# Patient Record
Sex: FEMALE | Race: WHITE | Employment: OTHER | URBAN - METROPOLITAN AREA
[De-identification: names, ages, dates, MRNs, and addresses within clinical notes are randomized per-mention and may not be internally consistent; named-entity substitution may affect disease eponyms.]

---

## 2017-02-24 PROBLEM — I44.30 AV BLOCK: Status: ACTIVE | Noted: 2017-02-24

## 2017-05-11 PROBLEM — M79.622 LEFT UPPER ARM PAIN: Status: ACTIVE | Noted: 2017-05-11

## 2017-12-12 PROBLEM — R53.83 FATIGUE: Status: ACTIVE | Noted: 2017-12-12

## 2018-03-13 ENCOUNTER — OFFICE VISIT (OUTPATIENT)
Dept: FAMILY MEDICINE CLINIC | Age: 83
End: 2018-03-13
Payer: MEDICARE

## 2018-03-13 VITALS
OXYGEN SATURATION: 90 % | HEIGHT: 60 IN | RESPIRATION RATE: 20 BRPM | WEIGHT: 120 LBS | BODY MASS INDEX: 23.56 KG/M2 | HEART RATE: 88 BPM | SYSTOLIC BLOOD PRESSURE: 118 MMHG | DIASTOLIC BLOOD PRESSURE: 62 MMHG

## 2018-03-13 DIAGNOSIS — F32.5 MAJOR DEPRESSIVE DISORDER WITH SINGLE EPISODE, IN FULL REMISSION (HCC): ICD-10-CM

## 2018-03-13 DIAGNOSIS — I48.20 CHRONIC ATRIAL FIBRILLATION (HCC): ICD-10-CM

## 2018-03-13 DIAGNOSIS — I10 ESSENTIAL HYPERTENSION, BENIGN: Chronic | ICD-10-CM

## 2018-03-13 DIAGNOSIS — E44.1 MILD PROTEIN-CALORIE MALNUTRITION (HCC): ICD-10-CM

## 2018-03-13 DIAGNOSIS — R53.83 FATIGUE, UNSPECIFIED TYPE: Primary | ICD-10-CM

## 2018-03-13 DIAGNOSIS — I95.1 ORTHOSTATIC HYPOTENSION: ICD-10-CM

## 2018-03-13 PROCEDURE — 1123F ACP DISCUSS/DSCN MKR DOCD: CPT | Performed by: FAMILY MEDICINE

## 2018-03-13 PROCEDURE — G8427 DOCREV CUR MEDS BY ELIG CLIN: HCPCS | Performed by: FAMILY MEDICINE

## 2018-03-13 PROCEDURE — 1036F TOBACCO NON-USER: CPT | Performed by: FAMILY MEDICINE

## 2018-03-13 PROCEDURE — G8420 CALC BMI NORM PARAMETERS: HCPCS | Performed by: FAMILY MEDICINE

## 2018-03-13 PROCEDURE — 4040F PNEUMOC VAC/ADMIN/RCVD: CPT | Performed by: FAMILY MEDICINE

## 2018-03-13 PROCEDURE — 99214 OFFICE O/P EST MOD 30 MIN: CPT | Performed by: FAMILY MEDICINE

## 2018-03-13 PROCEDURE — 1090F PRES/ABSN URINE INCON ASSESS: CPT | Performed by: FAMILY MEDICINE

## 2018-03-13 PROCEDURE — G8484 FLU IMMUNIZE NO ADMIN: HCPCS | Performed by: FAMILY MEDICINE

## 2018-03-13 ASSESSMENT — ENCOUNTER SYMPTOMS
BACK PAIN: 0
COLOR CHANGE: 0
BLOOD IN STOOL: 0
SHORTNESS OF BREATH: 1
SORE THROAT: 0
EYE REDNESS: 0
VOMITING: 0
NAUSEA: 0
CHEST TIGHTNESS: 0
EYE ITCHING: 0
CHANGE IN BOWEL HABIT: 0
CONSTIPATION: 0
APNEA: 0
EYE PAIN: 0
ABDOMINAL PAIN: 0
SINUS PRESSURE: 0
WHEEZING: 0
DIARRHEA: 0
RHINORRHEA: 0
COUGH: 0

## 2018-03-13 NOTE — PROGRESS NOTES
Chief Complaint:     Chief Complaint   Patient presents with   Lakisha May     has been real dizzy and short of breath, unsteady and heaviness in chest    Hypertension    Shortness of Breath         Hypertension   This is a chronic problem. The current episode started more than 1 year ago. The problem has been waxing and waning since onset. The problem is controlled. Associated symptoms include shortness of breath. Pertinent negatives include no anxiety, chest pain, headaches, malaise/fatigue, neck pain, palpitations or peripheral edema. There are no associated agents to hypertension. Risk factors for coronary artery disease include post-menopausal state. Past treatments include beta blockers. The current treatment provides significant improvement. There are no compliance problems. There is no history of CAD/MI, CVA or PVD. Shortness of Breath   This is a new problem. The current episode started today. The problem occurs intermittently. The problem has been waxing and waning. Pertinent negatives include no abdominal pain, chest pain, ear pain, fever, headaches, leg swelling, neck pain, rash, rhinorrhea, sore throat, vomiting or wheezing. Nothing aggravates the symptoms. The patient has no known risk factors for DVT/PE. She has tried nothing for the symptoms. The treatment provided no relief. Fatigue   This is a chronic problem. The current episode started more than 1 year ago. The problem occurs intermittently. The problem has been waxing and waning. Associated symptoms include fatigue. Pertinent negatives include no abdominal pain, anorexia, arthralgias, change in bowel habit, chest pain, congestion, coughing, fever, headaches, myalgias, nausea, neck pain, numbness, rash, sore throat, vomiting or weakness. Nothing aggravates the symptoms. She has tried nothing for the symptoms. The treatment provided no relief.        Patient Active Problem List   Diagnosis    Essential hypertension, benign    Dementia    Atrial fibrillation ([facility]    Depression    GERD (gastroesophageal reflux disease)    Valvular heart disease    Cardiac pacemaker in situ    CKD (chronic kidney disease) stage 3, GFR 30-59 ml/min    Dizziness    Orthostatic hypotension    Malnutrition (HCC)    Gait difficulty    Seborrheic keratoses    AV block    Left upper arm pain    Fatigue       Past Medical History:   Diagnosis Date    Acid reflux disease     Atrial fibrillation (HCC)     h/o    AVB (atrioventricular block)     COPD (chronic obstructive pulmonary disease) (HCC)     Dementia     Depression     Hypotension     Intracerebral hematoma ([facility] [address] 2009    d/t fall    Nausea & vomiting     Seizure disorder ([facility] [address] 2009    related to intracranial bleed    Syncope and collapse 5/25/2014       Past Surgical History:   Procedure Laterality Date    BRAIN SURGERY  2009    icb    BREAST SURGERY      cysts    CARDIAC CATHETERIZATION  2001 or 2012    CATARACT REMOVAL WITH IMPLANT Bilateral 10 yrs ago    CHOLECYSTECTOMY      ERCP  09/30/2014    With stent placement    ERCP  11/14/14    PACEMAKER INSERTION Left 6-    Dr. Mcarthur Duane       Current Outpatient Prescriptions   Medication Sig Dispense Refill    LORazepam (ATIVAN) 0.5 MG tablet Take 1 Half Tablet (0.25MG) by mouth once daily in the AM and take 3 Half Tablets (0.75MG) at bedtime 60 tablet 2    Dextromethorphan-guaiFENesin  MG/5ML SYRP Take 5 mLs by mouth every 4 hours as needed for Cough      ibuprofen (ADVIL;MOTRIN) 200 MG tablet Take 200 mg by mouth every 6 hours as needed for Pain      magnesium hydroxide (MILK OF MAGNESIA) 400 MG/5ML suspension Take by mouth daily as needed for Constipation      PARoxetine (PAXIL) 10 MG tablet Take 10 mg by mouth every morning      loratadine (CLARITIN) 10 MG tablet Take 10 mg by mouth daily      gabapentin (NEURONTIN) 100 MG capsule Take 100 mg by mouth 3 times daily      potassium chloride SA motion. She exhibits no edema or tenderness. Lymphadenopathy:     She has no cervical adenopathy. Neurological: She is alert and oriented to person, place, and time. She has normal reflexes. No cranial nerve deficit. Skin: Skin is warm and dry. No rash noted. No erythema. Psychiatric: She has a normal mood and affect. Nursing note and vitals reviewed. ASSESSMENT/PLAN:    Patient Active Problem List   Diagnosis    Essential hypertension, benign    Dementia    Atrial fibrillation (HCC)    Depression    GERD (gastroesophageal reflux disease)    Valvular heart disease    Cardiac pacemaker in situ    CKD (chronic kidney disease) stage 3, GFR 30-59 ml/min    Dizziness    Orthostatic hypotension    Malnutrition (HCC)    Gait difficulty    Seborrheic keratoses    AV block    Left upper arm pain    Fatigue       Dakota Turner was seen today for check-up, hypertension and shortness of breath. Diagnoses and all orders for this visit:    Fatigue, unspecified type    Major depressive disorder with single episode, in full remission ([address]    Chronic atrial fibrillation ([address]    Mild protein-calorie malnutrition ([address]    Orthostatic hypotension    Essential hypertension, benign          Return in about 3 months (around 6/13/2018) for Follow up HTN, Recheck Meds.         Som Roberts DO  3/13/2018  11:14 AM

## 2018-04-09 ENCOUNTER — TELEPHONE (OUTPATIENT)
Dept: FAMILY MEDICINE CLINIC | Age: 83
End: 2018-04-09

## 2018-04-27 ENCOUNTER — OFFICE VISIT (OUTPATIENT)
Dept: NON INVASIVE DIAGNOSTICS | Age: 83
End: 2018-04-27
Payer: MEDICARE

## 2018-04-27 VITALS
BODY MASS INDEX: 23.56 KG/M2 | HEART RATE: 88 BPM | SYSTOLIC BLOOD PRESSURE: 130 MMHG | RESPIRATION RATE: 16 BRPM | DIASTOLIC BLOOD PRESSURE: 70 MMHG | HEIGHT: 60 IN | WEIGHT: 120 LBS

## 2018-04-27 DIAGNOSIS — Z95.0 CARDIAC PACEMAKER IN SITU: Primary | ICD-10-CM

## 2018-04-27 DIAGNOSIS — I48.0 PAROXYSMAL ATRIAL FIBRILLATION (HCC): ICD-10-CM

## 2018-04-27 PROCEDURE — G8420 CALC BMI NORM PARAMETERS: HCPCS | Performed by: INTERNAL MEDICINE

## 2018-04-27 PROCEDURE — 93280 PM DEVICE PROGR EVAL DUAL: CPT | Performed by: INTERNAL MEDICINE

## 2018-04-27 PROCEDURE — G8427 DOCREV CUR MEDS BY ELIG CLIN: HCPCS | Performed by: INTERNAL MEDICINE

## 2018-04-27 PROCEDURE — 1036F TOBACCO NON-USER: CPT | Performed by: INTERNAL MEDICINE

## 2018-04-27 PROCEDURE — 99214 OFFICE O/P EST MOD 30 MIN: CPT | Performed by: INTERNAL MEDICINE

## 2018-04-27 PROCEDURE — 1123F ACP DISCUSS/DSCN MKR DOCD: CPT | Performed by: INTERNAL MEDICINE

## 2018-04-27 PROCEDURE — 1090F PRES/ABSN URINE INCON ASSESS: CPT | Performed by: INTERNAL MEDICINE

## 2018-04-27 PROCEDURE — 4040F PNEUMOC VAC/ADMIN/RCVD: CPT | Performed by: INTERNAL MEDICINE

## 2018-04-27 RX ORDER — FLUDROCORTISONE ACETATE 0.1 MG/1
TABLET ORAL
Qty: 90 TABLET | Refills: 5
Start: 2018-04-27 | End: 2020-09-03

## 2018-06-15 ENCOUNTER — OFFICE VISIT (OUTPATIENT)
Dept: FAMILY MEDICINE CLINIC | Age: 83
End: 2018-06-15
Payer: MEDICARE

## 2018-06-15 VITALS
SYSTOLIC BLOOD PRESSURE: 138 MMHG | DIASTOLIC BLOOD PRESSURE: 84 MMHG | OXYGEN SATURATION: 93 % | BODY MASS INDEX: 23.95 KG/M2 | WEIGHT: 122 LBS | HEIGHT: 60 IN | HEART RATE: 66 BPM | RESPIRATION RATE: 18 BRPM

## 2018-06-15 DIAGNOSIS — R53.83 FATIGUE, UNSPECIFIED TYPE: ICD-10-CM

## 2018-06-15 DIAGNOSIS — F03.90 DEMENTIA WITHOUT BEHAVIORAL DISTURBANCE, UNSPECIFIED DEMENTIA TYPE: Chronic | ICD-10-CM

## 2018-06-15 DIAGNOSIS — G25.0 BENIGN ESSENTIAL TREMOR: ICD-10-CM

## 2018-06-15 DIAGNOSIS — I95.1 ORTHOSTATIC HYPOTENSION: ICD-10-CM

## 2018-06-15 DIAGNOSIS — I48.20 CHRONIC ATRIAL FIBRILLATION (HCC): Primary | ICD-10-CM

## 2018-06-15 DIAGNOSIS — I10 ESSENTIAL HYPERTENSION, BENIGN: Chronic | ICD-10-CM

## 2018-06-15 PROCEDURE — 1036F TOBACCO NON-USER: CPT | Performed by: FAMILY MEDICINE

## 2018-06-15 PROCEDURE — 1123F ACP DISCUSS/DSCN MKR DOCD: CPT | Performed by: FAMILY MEDICINE

## 2018-06-15 PROCEDURE — G8420 CALC BMI NORM PARAMETERS: HCPCS | Performed by: FAMILY MEDICINE

## 2018-06-15 PROCEDURE — 99214 OFFICE O/P EST MOD 30 MIN: CPT | Performed by: FAMILY MEDICINE

## 2018-06-15 PROCEDURE — 4040F PNEUMOC VAC/ADMIN/RCVD: CPT | Performed by: FAMILY MEDICINE

## 2018-06-15 PROCEDURE — 1090F PRES/ABSN URINE INCON ASSESS: CPT | Performed by: FAMILY MEDICINE

## 2018-06-15 PROCEDURE — G8427 DOCREV CUR MEDS BY ELIG CLIN: HCPCS | Performed by: FAMILY MEDICINE

## 2018-06-15 ASSESSMENT — ENCOUNTER SYMPTOMS
EYE ITCHING: 0
COLOR CHANGE: 0
WHEEZING: 0
VOMITING: 0
DIARRHEA: 0
RHINORRHEA: 0
APNEA: 0
ABDOMINAL PAIN: 0
BACK PAIN: 0
COUGH: 0
SORE THROAT: 0
SHORTNESS OF BREATH: 0
EYE REDNESS: 0
BLOOD IN STOOL: 0
CHEST TIGHTNESS: 0
CONSTIPATION: 0
CHANGE IN BOWEL HABIT: 0
NAUSEA: 0
SINUS PRESSURE: 0
EYE PAIN: 0

## 2018-07-10 ENCOUNTER — TELEPHONE (OUTPATIENT)
Dept: FAMILY MEDICINE CLINIC | Age: 83
End: 2018-07-10

## 2018-07-10 NOTE — TELEPHONE ENCOUNTER
Demarco Christianson from [employer] calling for patient, states that when daughter was in for a visit, she began to get very anxious, started shaking and then has trouble concentrating/focusing during her episodes. She is doing okay now and all vitals are stable. She is taking Paxil 10 mg and Ativan 0.5 mg in AM and 0.75 mg qhs. Daughter and Demarco Christianson would like to know if you would like to add or change her treatment plan or continue and monitor for now?  Please advise can reach Demarco Christianson at [phone]

## 2018-07-13 DIAGNOSIS — F41.9 ANXIETY: Primary | ICD-10-CM

## 2018-07-13 RX ORDER — LORAZEPAM 0.5 MG/1
TABLET ORAL
Qty: 60 TABLET | Refills: 2 | Status: ON HOLD | OUTPATIENT
Start: 2018-07-13 | End: 2018-08-10 | Stop reason: HOSPADM

## 2018-07-30 ENCOUNTER — TELEPHONE (OUTPATIENT)
Dept: NON INVASIVE DIAGNOSTICS | Age: 83
End: 2018-07-30

## 2018-08-09 ENCOUNTER — HOSPITAL ENCOUNTER (OUTPATIENT)
Age: 83
Setting detail: OBSERVATION
Discharge: HOME | End: 2018-08-10
Attending: EMERGENCY MEDICINE | Admitting: INTERNAL MEDICINE
Payer: MEDICARE

## 2018-08-09 ENCOUNTER — APPOINTMENT (OUTPATIENT)
Dept: GENERAL RADIOLOGY | Age: 83
End: 2018-08-09
Payer: MEDICARE

## 2018-08-09 DIAGNOSIS — N17.9 ACUTE KIDNEY INJURY (HCC): ICD-10-CM

## 2018-08-09 DIAGNOSIS — I48.91 ATRIAL FIBRILLATION WITH RVR (HCC): Primary | ICD-10-CM

## 2018-08-09 LAB
ALBUMIN SERPL-MCNC: 3.6 G/DL (ref 3.5–5.2)
ALP BLD-CCNC: 110 U/L (ref 35–104)
ALT SERPL-CCNC: 11 U/L (ref 0–32)
ANION GAP SERPL CALCULATED.3IONS-SCNC: 13 MMOL/L (ref 7–16)
APTT: 26.9 SEC (ref 24.5–35.1)
AST SERPL-CCNC: 17 U/L (ref 0–31)
BACTERIA: ABNORMAL /HPF
BASOPHILS ABSOLUTE: 0.04 E9/L (ref 0–0.2)
BASOPHILS RELATIVE PERCENT: 0.3 % (ref 0–2)
BILIRUB SERPL-MCNC: 0.7 MG/DL (ref 0–1.2)
BILIRUBIN URINE: NEGATIVE
BLOOD, URINE: NEGATIVE
BUN BLDV-MCNC: 19 MG/DL (ref 8–23)
CALCIUM SERPL-MCNC: 9.5 MG/DL (ref 8.6–10.2)
CHLORIDE BLD-SCNC: 102 MMOL/L (ref 98–107)
CLARITY: CLEAR
CO2: 26 MMOL/L (ref 22–29)
COLOR: YELLOW
CREAT SERPL-MCNC: 1.3 MG/DL (ref 0.5–1)
EOSINOPHILS ABSOLUTE: 0.03 E9/L (ref 0.05–0.5)
EOSINOPHILS RELATIVE PERCENT: 0.3 % (ref 0–6)
EPITHELIAL CELLS, UA: ABNORMAL /HPF
GFR AFRICAN AMERICAN: 46
GFR NON-AFRICAN AMERICAN: 38 ML/MIN/1.73
GLUCOSE BLD-MCNC: 110 MG/DL (ref 74–109)
GLUCOSE URINE: NEGATIVE MG/DL
HCT VFR BLD CALC: 38.8 % (ref 34–48)
HEMOGLOBIN: 12.5 G/DL (ref 11.5–15.5)
IMMATURE GRANULOCYTES #: 0.05 E9/L
IMMATURE GRANULOCYTES %: 0.4 % (ref 0–5)
INR BLD: 1.2
KETONES, URINE: 15 MG/DL
LACTIC ACID: 1.8 MMOL/L (ref 0.5–2.2)
LEUKOCYTE ESTERASE, URINE: ABNORMAL
LYMPHOCYTES ABSOLUTE: 2.23 E9/L (ref 1.5–4)
LYMPHOCYTES RELATIVE PERCENT: 18.8 % (ref 20–42)
MAGNESIUM: 1.9 MG/DL (ref 1.6–2.6)
MCH RBC QN AUTO: 32.2 PG (ref 26–35)
MCHC RBC AUTO-ENTMCNC: 32.2 % (ref 32–34.5)
MCV RBC AUTO: 100 FL (ref 80–99.9)
MONOCYTES ABSOLUTE: 1.18 E9/L (ref 0.1–0.95)
MONOCYTES RELATIVE PERCENT: 10 % (ref 2–12)
NEUTROPHILS ABSOLUTE: 8.31 E9/L (ref 1.8–7.3)
NEUTROPHILS RELATIVE PERCENT: 70.2 % (ref 43–80)
NITRITE, URINE: POSITIVE
PDW BLD-RTO: 12.7 FL (ref 11.5–15)
PH UA: 6 (ref 5–9)
PLATELET # BLD: 142 E9/L (ref 130–450)
PMV BLD AUTO: 10.6 FL (ref 7–12)
POTASSIUM SERPL-SCNC: 4.2 MMOL/L (ref 3.5–5)
PRO-BNP: 981 PG/ML (ref 0–450)
PROTEIN UA: ABNORMAL MG/DL
PROTHROMBIN TIME: 13.6 SEC (ref 9.3–12.4)
RBC # BLD: 3.88 E12/L (ref 3.5–5.5)
RBC UA: ABNORMAL /HPF (ref 0–2)
SODIUM BLD-SCNC: 141 MMOL/L (ref 132–146)
SPECIFIC GRAVITY UA: 1.01 (ref 1–1.03)
TOTAL PROTEIN: 7.3 G/DL (ref 6.4–8.3)
TROPONIN: <0.01 NG/ML (ref 0–0.03)
UROBILINOGEN, URINE: 0.2 E.U./DL
WBC # BLD: 11.8 E9/L (ref 4.5–11.5)
WBC UA: >20 /HPF (ref 0–5)

## 2018-08-09 PROCEDURE — 85730 THROMBOPLASTIN TIME PARTIAL: CPT

## 2018-08-09 PROCEDURE — 99220 PR INITIAL OBSERVATION CARE/DAY 70 MINUTES: CPT | Performed by: INTERNAL MEDICINE

## 2018-08-09 PROCEDURE — G0378 HOSPITAL OBSERVATION PER HR: HCPCS

## 2018-08-09 PROCEDURE — 83605 ASSAY OF LACTIC ACID: CPT

## 2018-08-09 PROCEDURE — 2700000000 HC OXYGEN THERAPY PER DAY

## 2018-08-09 PROCEDURE — 81001 URINALYSIS AUTO W/SCOPE: CPT

## 2018-08-09 PROCEDURE — 99285 EMERGENCY DEPT VISIT HI MDM: CPT

## 2018-08-09 PROCEDURE — 83735 ASSAY OF MAGNESIUM: CPT

## 2018-08-09 PROCEDURE — 71045 X-RAY EXAM CHEST 1 VIEW: CPT

## 2018-08-09 PROCEDURE — 80053 COMPREHEN METABOLIC PANEL: CPT

## 2018-08-09 PROCEDURE — 2580000003 HC RX 258: Performed by: EMERGENCY MEDICINE

## 2018-08-09 PROCEDURE — 85025 COMPLETE CBC W/AUTO DIFF WBC: CPT

## 2018-08-09 PROCEDURE — 83880 ASSAY OF NATRIURETIC PEPTIDE: CPT

## 2018-08-09 PROCEDURE — 2500000003 HC RX 250 WO HCPCS: Performed by: EMERGENCY MEDICINE

## 2018-08-09 PROCEDURE — 6370000000 HC RX 637 (ALT 250 FOR IP): Performed by: INTERNAL MEDICINE

## 2018-08-09 PROCEDURE — 2580000003 HC RX 258: Performed by: NURSE PRACTITIONER

## 2018-08-09 PROCEDURE — 96372 THER/PROPH/DIAG INJ SC/IM: CPT

## 2018-08-09 PROCEDURE — 84484 ASSAY OF TROPONIN QUANT: CPT

## 2018-08-09 PROCEDURE — 6370000000 HC RX 637 (ALT 250 FOR IP): Performed by: EMERGENCY MEDICINE

## 2018-08-09 PROCEDURE — APPSS45 APP SPLIT SHARED TIME 31-45 MINUTES: Performed by: NURSE PRACTITIONER

## 2018-08-09 PROCEDURE — 96374 THER/PROPH/DIAG INJ IV PUSH: CPT

## 2018-08-09 PROCEDURE — 6360000002 HC RX W HCPCS: Performed by: NURSE PRACTITIONER

## 2018-08-09 PROCEDURE — 85610 PROTHROMBIN TIME: CPT

## 2018-08-09 PROCEDURE — 87040 BLOOD CULTURE FOR BACTERIA: CPT

## 2018-08-09 PROCEDURE — 6370000000 HC RX 637 (ALT 250 FOR IP): Performed by: NURSE PRACTITIONER

## 2018-08-09 RX ORDER — DILTIAZEM HYDROCHLORIDE 120 MG/1
120 CAPSULE, COATED, EXTENDED RELEASE ORAL ONCE
Status: COMPLETED | OUTPATIENT
Start: 2018-08-09 | End: 2018-08-09

## 2018-08-09 RX ORDER — FLUDROCORTISONE ACETATE 0.1 MG/1
0.1 TABLET ORAL
Status: DISCONTINUED | OUTPATIENT
Start: 2018-08-09 | End: 2018-08-10 | Stop reason: HOSPADM

## 2018-08-09 RX ORDER — 0.9 % SODIUM CHLORIDE 0.9 %
30 INTRAVENOUS SOLUTION INTRAVENOUS ONCE
Status: COMPLETED | OUTPATIENT
Start: 2018-08-09 | End: 2018-08-09

## 2018-08-09 RX ORDER — FLUDROCORTISONE ACETATE 0.1 MG/1
0.2 TABLET ORAL EVERY MORNING
Status: DISCONTINUED | OUTPATIENT
Start: 2018-08-10 | End: 2018-08-10 | Stop reason: HOSPADM

## 2018-08-09 RX ORDER — ASPIRIN 81 MG/1
81 TABLET, CHEWABLE ORAL EVERY MORNING
Status: DISCONTINUED | OUTPATIENT
Start: 2018-08-10 | End: 2018-08-10 | Stop reason: HOSPADM

## 2018-08-09 RX ORDER — ADHESIVE BANDAGE 3/4"
1 BANDAGE TOPICAL PRN
COMMUNITY

## 2018-08-09 RX ORDER — ACETAMINOPHEN 325 MG/1
650 TABLET ORAL NIGHTLY PRN
Status: DISCONTINUED | OUTPATIENT
Start: 2018-08-09 | End: 2018-08-10 | Stop reason: HOSPADM

## 2018-08-09 RX ORDER — MEMANTINE HYDROCHLORIDE 5 MG/1
5 TABLET ORAL DAILY
Status: DISCONTINUED | OUTPATIENT
Start: 2018-08-09 | End: 2018-08-10 | Stop reason: HOSPADM

## 2018-08-09 RX ORDER — SODIUM CHLORIDE 0.9 % (FLUSH) 0.9 %
10 SYRINGE (ML) INJECTION EVERY 12 HOURS SCHEDULED
Status: DISCONTINUED | OUTPATIENT
Start: 2018-08-09 | End: 2018-08-10 | Stop reason: HOSPADM

## 2018-08-09 RX ORDER — ETOMIDATE 2 MG/ML
8 INJECTION INTRAVENOUS ONCE
Status: COMPLETED | OUTPATIENT
Start: 2018-08-09 | End: 2018-08-09

## 2018-08-09 RX ORDER — CETIRIZINE HYDROCHLORIDE 10 MG/1
5 TABLET ORAL DAILY
Status: DISCONTINUED | OUTPATIENT
Start: 2018-08-09 | End: 2018-08-10 | Stop reason: HOSPADM

## 2018-08-09 RX ORDER — PAROXETINE 10 MG/1
10 TABLET, FILM COATED ORAL EVERY MORNING
Status: DISCONTINUED | OUTPATIENT
Start: 2018-08-10 | End: 2018-08-10 | Stop reason: HOSPADM

## 2018-08-09 RX ORDER — ETOMIDATE 2 MG/ML
INJECTION INTRAVENOUS DAILY PRN
Status: COMPLETED | OUTPATIENT
Start: 2018-08-09 | End: 2018-08-09

## 2018-08-09 RX ORDER — GABAPENTIN 100 MG/1
100 CAPSULE ORAL EVERY MORNING
Status: DISCONTINUED | OUTPATIENT
Start: 2018-08-10 | End: 2018-08-10 | Stop reason: HOSPADM

## 2018-08-09 RX ORDER — SODIUM CHLORIDE 0.9 % (FLUSH) 0.9 %
10 SYRINGE (ML) INJECTION PRN
Status: DISCONTINUED | OUTPATIENT
Start: 2018-08-09 | End: 2018-08-10 | Stop reason: HOSPADM

## 2018-08-09 RX ORDER — GUAIFENESIN 100 MG/5ML
200 SYRUP ORAL EVERY 6 HOURS PRN
COMMUNITY
End: 2018-12-21

## 2018-08-09 RX ORDER — LORAZEPAM 0.5 MG/1
0.75 TABLET ORAL NIGHTLY
Status: ON HOLD | COMMUNITY
End: 2018-08-10 | Stop reason: HOSPADM

## 2018-08-09 RX ORDER — FLUDROCORTISONE ACETATE 0.1 MG/1
0.2 TABLET ORAL EVERY MORNING
Status: ON HOLD | COMMUNITY
End: 2018-08-10 | Stop reason: HOSPADM

## 2018-08-09 RX ORDER — POLYVINYL ALCOHOL 14 MG/ML
1 SOLUTION/ DROPS OPHTHALMIC 4 TIMES DAILY
Status: DISCONTINUED | OUTPATIENT
Start: 2018-08-09 | End: 2018-08-10 | Stop reason: HOSPADM

## 2018-08-09 RX ORDER — ONDANSETRON 2 MG/ML
4 INJECTION INTRAMUSCULAR; INTRAVENOUS EVERY 6 HOURS PRN
Status: DISCONTINUED | OUTPATIENT
Start: 2018-08-09 | End: 2018-08-10 | Stop reason: HOSPADM

## 2018-08-09 RX ORDER — PANTOPRAZOLE SODIUM 40 MG/1
40 TABLET, DELAYED RELEASE ORAL EVERY MORNING
Status: DISCONTINUED | OUTPATIENT
Start: 2018-08-10 | End: 2018-08-10 | Stop reason: HOSPADM

## 2018-08-09 RX ORDER — CETIRIZINE HYDROCHLORIDE 10 MG/1
10 TABLET ORAL DAILY
Status: DISCONTINUED | OUTPATIENT
Start: 2018-08-09 | End: 2018-08-09 | Stop reason: DRUGHIGH

## 2018-08-09 RX ADMIN — SODIUM CHLORIDE 1632 ML: 9 INJECTION, SOLUTION INTRAVENOUS at 11:48

## 2018-08-09 RX ADMIN — Medication 10 ML: at 20:29

## 2018-08-09 RX ADMIN — POLYVINYL ALCOHOL 1 DROP: 14 SOLUTION/ DROPS OPHTHALMIC at 20:28

## 2018-08-09 RX ADMIN — DILTIAZEM HYDROCHLORIDE 120 MG: 120 CAPSULE, COATED, EXTENDED RELEASE ORAL at 14:01

## 2018-08-09 RX ADMIN — MEMANTINE HYDROCHLORIDE 5 MG: 5 TABLET ORAL at 20:28

## 2018-08-09 RX ADMIN — FLUDROCORTISONE ACETATE 0.1 MG: 0.1 TABLET ORAL at 20:28

## 2018-08-09 RX ADMIN — CETIRIZINE HYDROCHLORIDE 5 MG: 10 TABLET, FILM COATED ORAL at 20:28

## 2018-08-09 RX ADMIN — ETOMIDATE 8 MG: 2 INJECTION INTRAVENOUS at 11:57

## 2018-08-09 RX ADMIN — ACETAMINOPHEN 650 MG: 325 TABLET ORAL at 21:44

## 2018-08-09 RX ADMIN — ENOXAPARIN SODIUM 30 MG: 30 INJECTION SUBCUTANEOUS at 20:28

## 2018-08-09 ASSESSMENT — PAIN SCALES - GENERAL
PAINLEVEL_OUTOF10: 5
PAINLEVEL_OUTOF10: 0
PAINLEVEL_OUTOF10: 0

## 2018-08-09 ASSESSMENT — PAIN DESCRIPTION - FREQUENCY: FREQUENCY: CONTINUOUS

## 2018-08-09 ASSESSMENT — PAIN DESCRIPTION - ORIENTATION: ORIENTATION: MID

## 2018-08-09 ASSESSMENT — PAIN DESCRIPTION - LOCATION: LOCATION: CHEST

## 2018-08-09 ASSESSMENT — PAIN DESCRIPTION - DESCRIPTORS: DESCRIPTORS: PRESSURE

## 2018-08-09 ASSESSMENT — PAIN DESCRIPTION - PAIN TYPE: TYPE: ACUTE PAIN

## 2018-08-09 NOTE — ED PROVIDER NOTES
with severe systemic disease that limits activity but is not incapacitating  Sedation/ Anesthesia Plan: intravenous sedation  Medications Used: etomidate intravenously  Monitoring and Safety: The patient was placed on a cardiac monitor and vital signs, pulse oximetry and level of consciousness were continuously evaluated throughout the procedure. The patient was closely monitored until recovery from the medications was complete and the patient had returned to baseline status. Respiratory therapy was on standby at all times during the procedure.    ----------------------------------- Post Conscious Sedation Note -----------------------------------  (The following Post Sedation note must be completed)  8/9/18     Time: 1145 (as per nursing note stop time)  Post-Sedation Vital Signs: Vital signs were reviewed and were stable after the procedure (see flow sheet for vitals) Post-Sedation Exam: Lungs: clear to auscultation bilaterally and Cardiovascular: regular rate and rhythm, no murmurs rubs or gallops       Complications: none    Electronically Signed by: Andrew Street MD       PROCEDURE  8/9/18       Time: 1136    CARDIOVERSION  Risks, benefits and alternatives (for applicable procedures below) described. Performed By: Andrew Street MD.    Indication: atrial fibrillation. Informed consent obtained: The patient provided verbal consent for this procedure. .  Procedure:  Vagal maneuvers were not attempted. Prior to the Procedure, anesthsia given: yes; conscious sedation sheet completed. .  Cardioversion was performed under my order and direction, and was attempted by synchronous mode, 1 times with 50 Joules. The resultant rhythm was:  conversion to normal sinus rhythm. Complications: None. Patient tolerated the procedure well. Cardiology Consult Placed:  yes. Medical Decision Making:    Pt is a 79 y/o Female presenting to ED for shortness of breath, chest pain, tachycardia.  ED medications ordered. Labs obtained and reviewed. cardiology consult they agree with cardiovert. Pt and pt daughter both understand the risks and benefits of the cardiovert and give consent. Counseling: The emergency provider has spoken with the patient and discussed todays results, in addition to providing specific details for the plan of care and counseling regarding the diagnosis and prognosis. Questions are answered at this time and they are agreeable with the plan. Time: 11:39AM.   Spoke with Dr. Jenna Cedeno (Cardiology). Discussed case. They agree pt needs cardioverted . Time: 1:50PM.   Spoke with Dr. Federica Anthony (Medicine). Discussed case. They will admit this patient.    --------------------------------- IMPRESSION AND DISPOSITION ---------------------------------    IMPRESSION  1. Atrial fibrillation with RVR ([facility] [address]    2. Acute kidney injury ([facility] [address]        DISPOSITION  Disposition: Admit to telemetry  Patient condition is poor    8/9/18, 11:30 AM.    This note is prepared by Mode Camargo, acting as Scribe for Angel Alcocer MD.    Angel Alcocer MD:  The scribe's documentation has been prepared under my direction and personally reviewed by me in its entirety. I confirm that the note above accurately reflects all work, treatment, procedures, and medical decision making performed by me.            Angel Alcocer MD  08/09/18 1117

## 2018-08-09 NOTE — ED NOTES
Brother, daughter, and other family member at bedside, patient alert and answering appropriately     Avis Truong RN  08/09/18 1957

## 2018-08-09 NOTE — H&P
[facility] History and Physical      CHIEF COMPLAINT:  Chest pain and shortness of breath    History of Present Ilness: This is a 80 y.o. Female presenting to [facility] from a SNF with complaints of chest pain and shortness and breath. She has a past medical history significant for PAF, COPD, Dementia, intracerebral hematoma with seizure post surgery and pacemaker. She reports shortness of breath and chest discomfort for approximately one week that worsened over the last week. She states this morning when she tried to get up into her rocking chair the pain became so intense and she could not cath her breath. Her daughter who came to visit could feel her that he heart rate was racing asked that she come tot  ER for evaluation. She reports seeing her PCP recently for tremors and weakness. Her daughter states nothing was found on her workup. She saw Dr. Vipul Lane and her pacer was checked over the phone and she was told it was working fine. She reports she is unsure when she was originally diagnosed with afib, per EMR her last visit to Dr. Vipul Lane was in April 2018 and she was in sinus rhythm at the time. She was diagnosed with PAF in 2014. She has not been a candidate for anticoagulation due to falls. In ER she was found to be in Afib with RVR. She was noted to be very hypotensive in the 70's. She is known to Dr. Vipul Lane so a consult was placed to EP and Dr. Asif Kapoor recommended cardioversion. She was cardioverted while in ER and is now in sinus rhythm. She was given one dose of oral Cardizem. Her vital signs have improved and she is now pain free and breathing has improved. She remains on 2L NC. She will be admitted for further evaluation and treatment. All past medical, surgical, family and social history was reviewed and verified with the patient and family present.      Informant(s) for H&P:Patient, daughter, son, EMR    REVIEW OF SYSTEMS:  A comprehensive review of systems was negative except for: what is in the [facility]      PMH:  Past Medical History:   Diagnosis Date    Acid reflux disease     Atrial fibrillation ([facility] [address]     h/o    AVB (atrioventricular block)     COPD (chronic obstructive pulmonary disease) (HCC)     Dementia     Depression     Hypotension     Intracerebral hematoma ([facility] [address] 2009    d/t fall    Nausea & vomiting     Seizure disorder ([facility] [address] 2009    related to intracranial bleed    Syncope and collapse 5/25/2014       Surgical History:  Past Surgical History:   Procedure Laterality Date    BRAIN SURGERY  2009    icb    BREAST SURGERY      cysts    CARDIAC CATHETERIZATION  2001 or 2012    CATARACT REMOVAL WITH IMPLANT Bilateral 10 yrs ago    CHOLECYSTECTOMY      ERCP  09/30/2014    With stent placement    ERCP  11/14/14    PACEMAKER INSERTION Left 6-    Dr. Lisa Anand       Medications Prior to Admission:    Prior to Admission medications    Medication Sig Start Date End Date Taking? Authorizing Provider   LORazepam (ATIVAN) 0.5 MG tablet Take 0.75 mg by mouth nightly. .   Yes Historical Provider, MD   fludrocortisone (FLORINEF) 0.1 MG tablet Take 0.2 mg by mouth every morning   Yes Historical Provider, MD   Sodium Phosphates (ENEMA DISPOSABLE) ENEM Place 1 enema rectally as needed   Yes Historical Provider, MD   guaiFENesin (ROBITUSSIN) 100 MG/5ML syrup Take 200 mg by mouth every 6 hours as needed for Cough   Yes Historical Provider, MD   LORazepam (ATIVAN) 0.5 MG tablet Take 1 tablet by mouth daily as needed. Patient taking differently: Take 0.5 mg by mouth every morning.  . 7/13/18 10/13/18 Yes Juancarlos Neil,    hypromellose (ARTIFICIAL TEARS) 0.4 % SOLN ophthalmic solution Place 1 drop into both eyes 4 times daily    Yes Historical Provider, MD   fludrocortisone (FLORINEF) 0.1 MG tablet Take 2 tablets daily in the morning and 1 tablet daily in the afternoon  Patient taking differently: Take 0.1 mg by mouth Daily with lunch  4/27/18  Yes Ju Oneal branch block. Interpretation: no acute changes  Comparison: no previous EKG available     Repeat EKG.    EKG: This EKG is signed and interpreted by the EP. Time: 12:12  Rate: 95  Rhythm: normal sinus rhythm  Interpretation: no acute changes  Comparison: no previous EKG available    ASSESSMENT:      Principal Problem:    Atrial fibrillation (HCC)  Active Problems:    Dementia    Depression    CKD (chronic kidney disease) stage 3, GFR 30-59 ml/min    Orthostatic hypotension    AV block  Resolved Problems:    * No resolved hospital problems. *      PLAN:    1. Atrial fibrillation: RVR on admission. H/O PAF diagnosed in 2014. Cardioverted in ER. Currently in sinus rhythm. Received PO Cardizem x 1 dose. Admit to telemetry. Consult placed to EP. Continue ASA. Was previously not a candidate for anticoagulation due to falls. CHA2DS2-VASc=3 Defer to EP. 2. H/O AV block: Pacer placed in 2014  3. H/O Orthostatic hypotension: Continue Florinef. Monitor BP   4. Dementia: Continue Namenda  5. Depression: stable continue meds  6. SHERIE on CKD: likely related to #1. Will repeat labs in AM. Unsure of patient's baseline creatinine. 7. PT/OT eval and treat    Code Status: DNRCCA  DVT prophylaxis: Lovenox        Electronically signed by CASSIE Phelps CNP on 8/9/2018 at 3:53 PM   HOSPITALIST ATTENDING PHYSICIAN NOTE 8/9/2018 1851PM:    Details of the evaluation - subjective assessment (including medication profile, past medical, family and social history when applicable), examination, review of lab and test data, diagnostic impressions and medical decision making - performed by CASSIE Phelps CNP, were discussed with me on the date of service and I agree with clinical information herein unless otherwise noted. The patient has been evaluated by me personally at this time.   Pt reports no fevers, chills,n/v         PHYSICAL EXAM:    Vitals:  BP (!) 146/82   Pulse 82   Temp 98.4 °F (36.9 °C) (Oral)   Resp 16

## 2018-08-10 VITALS
HEIGHT: 60 IN | WEIGHT: 115 LBS | DIASTOLIC BLOOD PRESSURE: 58 MMHG | HEART RATE: 88 BPM | TEMPERATURE: 98.6 F | BODY MASS INDEX: 22.58 KG/M2 | RESPIRATION RATE: 18 BRPM | OXYGEN SATURATION: 93 % | SYSTOLIC BLOOD PRESSURE: 126 MMHG

## 2018-08-10 LAB
ANION GAP SERPL CALCULATED.3IONS-SCNC: 10 MMOL/L (ref 7–16)
BUN BLDV-MCNC: 14 MG/DL (ref 8–23)
CALCIUM SERPL-MCNC: 8.7 MG/DL (ref 8.6–10.2)
CHLORIDE BLD-SCNC: 107 MMOL/L (ref 98–107)
CO2: 25 MMOL/L (ref 22–29)
CREAT SERPL-MCNC: 0.9 MG/DL (ref 0.5–1)
GFR AFRICAN AMERICAN: >60
GFR NON-AFRICAN AMERICAN: 58 ML/MIN/1.73
GLUCOSE BLD-MCNC: 94 MG/DL (ref 74–109)
HCT VFR BLD CALC: 33 % (ref 34–48)
HEMOGLOBIN: 10.9 G/DL (ref 11.5–15.5)
MAGNESIUM: 1.7 MG/DL (ref 1.6–2.6)
MCH RBC QN AUTO: 32.8 PG (ref 26–35)
MCHC RBC AUTO-ENTMCNC: 33 % (ref 32–34.5)
MCV RBC AUTO: 99.4 FL (ref 80–99.9)
PDW BLD-RTO: 12.5 FL (ref 11.5–15)
PLATELET # BLD: 133 E9/L (ref 130–450)
PMV BLD AUTO: 10 FL (ref 7–12)
POTASSIUM REFLEX MAGNESIUM: 3.5 MMOL/L (ref 3.5–5)
RBC # BLD: 3.32 E12/L (ref 3.5–5.5)
SODIUM BLD-SCNC: 142 MMOL/L (ref 132–146)
WBC # BLD: 8.9 E9/L (ref 4.5–11.5)

## 2018-08-10 PROCEDURE — 2700000000 HC OXYGEN THERAPY PER DAY

## 2018-08-10 PROCEDURE — 96372 THER/PROPH/DIAG INJ SC/IM: CPT

## 2018-08-10 PROCEDURE — 85027 COMPLETE CBC AUTOMATED: CPT

## 2018-08-10 PROCEDURE — 6370000000 HC RX 637 (ALT 250 FOR IP): Performed by: NURSE PRACTITIONER

## 2018-08-10 PROCEDURE — 83735 ASSAY OF MAGNESIUM: CPT

## 2018-08-10 PROCEDURE — G8987 SELF CARE CURRENT STATUS: HCPCS

## 2018-08-10 PROCEDURE — G0378 HOSPITAL OBSERVATION PER HR: HCPCS

## 2018-08-10 PROCEDURE — 97165 OT EVAL LOW COMPLEX 30 MIN: CPT

## 2018-08-10 PROCEDURE — 80048 BASIC METABOLIC PNL TOTAL CA: CPT

## 2018-08-10 PROCEDURE — G8980 MOBILITY D/C STATUS: HCPCS

## 2018-08-10 PROCEDURE — 97161 PT EVAL LOW COMPLEX 20 MIN: CPT

## 2018-08-10 PROCEDURE — 99217 PR OBSERVATION CARE DISCHARGE MANAGEMENT: CPT | Performed by: INTERNAL MEDICINE

## 2018-08-10 PROCEDURE — 6370000000 HC RX 637 (ALT 250 FOR IP)

## 2018-08-10 PROCEDURE — 6360000002 HC RX W HCPCS: Performed by: NURSE PRACTITIONER

## 2018-08-10 PROCEDURE — G8988 SELF CARE GOAL STATUS: HCPCS

## 2018-08-10 PROCEDURE — 36415 COLL VENOUS BLD VENIPUNCTURE: CPT

## 2018-08-10 PROCEDURE — G8979 MOBILITY GOAL STATUS: HCPCS

## 2018-08-10 PROCEDURE — 2580000003 HC RX 258: Performed by: NURSE PRACTITIONER

## 2018-08-10 PROCEDURE — 6370000000 HC RX 637 (ALT 250 FOR IP): Performed by: INTERNAL MEDICINE

## 2018-08-10 PROCEDURE — G8978 MOBILITY CURRENT STATUS: HCPCS

## 2018-08-10 RX ORDER — POTASSIUM CHLORIDE 20 MEQ/1
40 TABLET, EXTENDED RELEASE ORAL ONCE
Status: COMPLETED | OUTPATIENT
Start: 2018-08-10 | End: 2018-08-10

## 2018-08-10 RX ORDER — CEPHALEXIN 500 MG/1
500 CAPSULE ORAL EVERY 12 HOURS SCHEDULED
Status: DISCONTINUED | OUTPATIENT
Start: 2018-08-10 | End: 2018-08-10

## 2018-08-10 RX ORDER — CEPHALEXIN 250 MG/1
250 CAPSULE ORAL EVERY 12 HOURS SCHEDULED
Status: DISCONTINUED | OUTPATIENT
Start: 2018-08-10 | End: 2018-08-10

## 2018-08-10 RX ADMIN — POLYVINYL ALCOHOL 1 DROP: 14 SOLUTION/ DROPS OPHTHALMIC at 08:23

## 2018-08-10 RX ADMIN — MEMANTINE HYDROCHLORIDE 5 MG: 5 TABLET ORAL at 08:22

## 2018-08-10 RX ADMIN — GABAPENTIN 100 MG: 100 CAPSULE ORAL at 08:30

## 2018-08-10 RX ADMIN — PAROXETINE HYDROCHLORIDE HEMIHYDRATE 10 MG: 10 TABLET, FILM COATED ORAL at 08:22

## 2018-08-10 RX ADMIN — Medication 10 ML: at 08:23

## 2018-08-10 RX ADMIN — ASPIRIN 81 MG 81 MG: 81 TABLET ORAL at 08:22

## 2018-08-10 RX ADMIN — CETIRIZINE HYDROCHLORIDE 5 MG: 10 TABLET, FILM COATED ORAL at 08:22

## 2018-08-10 RX ADMIN — PANTOPRAZOLE SODIUM 40 MG: 40 TABLET, DELAYED RELEASE ORAL at 08:23

## 2018-08-10 RX ADMIN — POTASSIUM CHLORIDE 40 MEQ: 1500 TABLET, EXTENDED RELEASE ORAL at 10:51

## 2018-08-10 RX ADMIN — FLUDROCORTISONE ACETATE 0.2 MG: 0.1 TABLET ORAL at 08:22

## 2018-08-10 RX ADMIN — ENOXAPARIN SODIUM 30 MG: 30 INJECTION SUBCUTANEOUS at 08:30

## 2018-08-10 ASSESSMENT — PAIN SCALES - GENERAL: PAINLEVEL_OUTOF10: 0

## 2018-08-10 NOTE — DISCHARGE SUMMARY
Oral  Oral   SpO2:  96%  93%   Weight:   115 lb (52.2 kg)    Height:         General Appearance: alert and oriented to person, place and time and in no acute distress  Skin: warm and dry,   Head: normocephalic and atraumatic. [place]  Neck: neck supple and non tender without mass   Pulmonary/Chest: clear to auscultation bilaterally- no wheezes, rales or rhonchi, normal air movement, no respiratory distress  Cardiovascular: normal rate,regular rhythm, normal S1 and S2   Abdomen: soft, non-tender, non-distended, normal bowel sounds, no masses or organomegaly  Extremities: no cyanosis, no clubbing and no edema  Neurologic: speech normal     No intake/output data recorded. I/O this shift:  In: 240 [P.O.:240]  Out: -       LABS:  Recent Labs      08/09/18   1133  08/10/18   0521   NA  141  142   K  4.2  3.5   CL  102  107   CO2  26  25   BUN  19  14   CREATININE  1.3*  0.9   GLUCOSE  110*  94   CALCIUM  9.5  8.7       Recent Labs      08/09/18   1133  08/10/18   0521   WBC  11.8*  8.9   RBC  3.88  3.32*   HGB  12.5  10.9*   HCT  38.8  33.0*   MCV  100.0*  99.4   MCH  32.2  32.8   MCHC  32.2  33.0   RDW  12.7  12.5   PLT  142  133   MPV  10.6  10.0       No results for input(s): POCGLU in the last 72 hours. Last 3 Troponin:    Lab Results   Component Value Date    TROPONINI <0.01 08/09/2018    TROPONINI <0.01 08/01/2015    TROPONINI <0.01 06/18/2015       Imaging:   XR CHEST PORTABLE   Final Result      No airspace opacities or pleural effusion. Patient Instructions:      Medication List      CHANGE how you take these medications    fludrocortisone 0.1 MG tablet  Commonly known as:  FLORINEF  Take 2 tablets daily in the morning and 1 tablet daily in the afternoon  What changed:  · how much to take  · how to take this  · when to take this  · additional instructions  · Another medication with the same name was removed. Continue taking this medication, and follow the directions you see here.         CONTINUE

## 2018-08-10 NOTE — PROGRESS NOTES
Functional Mobility Supervision  W/ ww  Education on ww management      Sit balance: independent  Stand balance: Supervision w/ ww  Endurance/Activity tolerance: Fair                              Comments: Upon arrival pt lying in bed. At end of session pt seated in chair with all devices within reach, all lines and tubes intact. Safety alarm on       Assessment of current deficits   Functional mobility [x]  ROM [] Strength [x]  Cognition []  ADLs [x]   IADLs [] Safety Awareness [x] Endurance [x]  Fine Motor Coordination [] Balance [x] Vision/perception [] Sensation []   Gross Motor Coordination []     Eval Complexity: Low    Treatment frequency: 1-3x/wk     Plan of Care:  ADL retraining [x]   Equipment needs [x]   Neuromuscular re-education [] Energy Conservation Techniques [x]  Functional Transfer training [x] Patient and/or Family Education [x]  Functional Mobility training [x]  Environmental Modifications []  Cognitive re-training []   Compensatory techniques for ADLs [x]  Splinting Needs []   Positioning to improve overall function [x]  Other: []      Rehab Potential: Good    Patient / Family Goal: To return to NH    Short term goals  Time Frame: 3-5 days  STG 1 :  Pt will complete LB self-care tasks with mod I  STG 2 :  Pt will complete functional transfers with mod I  STG 3 :  Pt will demonstrate Good activity tolerance while completing ADL task. STG 4 :  Pt will complete functional ambulation / item retrieval task with mod I   Pt participated in establishment of the following goals         OT goals were explained to patient     Patient and/or family understands diagnosis, prognosis and plan of care: yes   Pt educated on safety and OT POC. Patient  verbalized good understanding of education, requires additional education       [] Malnutrition indicators have been identified and nursing has been notified to ensure a dietitian consult is ordered.          Low Evaluation completed     Time in: 09:03  Time out: 09:17      Alexander Danielson OTR/L #[id number]

## 2018-08-10 NOTE — PROGRESS NOTES
P Quality Flow/Interdisciplinary Rounds Progress Note        Quality Flow Rounds held on August 10, 2018    Disciplines Attending:  Bedside Nurse, ,  and Nursing Unit Leadership    Yoav Roland was admitted on 8/9/2018 11:09 AM    Anticipated Discharge Date:  Expected Discharge Date: 08/12/18    Disposition:    Pablo Score:  Pablo Scale Score: 21    Readmission Risk              Risk of Unplanned Readmission:        10             Discussed patient goal for the day, patient clinical progression, and barriers to discharge. The following Goal(s) of the Day/Commitment(s) have been identified:  Check EP plan.       Holger Le  August 10, 2018

## 2018-08-10 NOTE — PROGRESS NOTES
safety with transfers and walker especially when approaching chair to sit    Patient response to education:   Pt verbalized understanding Pt demonstrated skill Pt requires further education in this area   no no yes     Rehab potential is good for reaching above PT goals. Patient and or family understand(s) diagnosis, prognosis, and plan of care. PLAN  PT care will be provided in accordance with the objectives noted above. Whenever appropriate, clear delegation orders will be provided for nursing staff. Exercises and functional mobility practice will be used as well as appropriate assistive devices or modalities to obtain goals. Patient and family education will also be administered as needed. Frequency of treatments will be 2-5x/week x 3 days.     lOy PT  [id number]

## 2018-08-14 ENCOUNTER — TELEPHONE (OUTPATIENT)
Dept: FAMILY MEDICINE CLINIC | Age: 83
End: 2018-08-14

## 2018-08-14 ENCOUNTER — NURSE ONLY (OUTPATIENT)
Dept: NON INVASIVE DIAGNOSTICS | Age: 83
End: 2018-08-14
Payer: MEDICARE

## 2018-08-14 ENCOUNTER — TELEPHONE (OUTPATIENT)
Dept: ADMINISTRATIVE | Age: 83
End: 2018-08-14

## 2018-08-14 DIAGNOSIS — I44.30 AV BLOCK: ICD-10-CM

## 2018-08-14 DIAGNOSIS — Z95.0 CARDIAC PACEMAKER IN SITU: Primary | ICD-10-CM

## 2018-08-14 DIAGNOSIS — Z51.5 END OF LIFE CARE: Primary | ICD-10-CM

## 2018-08-14 PROBLEM — I48.91 ATRIAL FIBRILLATION WITH RVR (HCC): Status: RESOLVED | Noted: 2018-08-09 | Resolved: 2018-08-14

## 2018-08-14 LAB
BLOOD CULTURE, ROUTINE: NORMAL
CULTURE, BLOOD 2: NORMAL

## 2018-08-14 PROCEDURE — 93296 REM INTERROG EVL PM/IDS: CPT | Performed by: INTERNAL MEDICINE

## 2018-08-14 PROCEDURE — 93294 REM INTERROG EVL PM/LDLS PM: CPT | Performed by: INTERNAL MEDICINE

## 2018-08-14 RX ORDER — LORAZEPAM 1 MG/1
1 TABLET ORAL 2 TIMES DAILY PRN
Qty: 60 TABLET | Refills: 0 | Status: SHIPPED | OUTPATIENT
Start: 2018-08-14 | End: 2018-09-13

## 2018-08-14 RX ORDER — MORPHINE SULFATE 20 MG/5ML
SOLUTION ORAL
Qty: 80 ML | Refills: 0 | Status: SHIPPED | OUTPATIENT
Start: 2018-08-14 | End: 2018-08-20

## 2018-08-14 RX ORDER — MORPHINE SULFATE 100 MG/5ML
5 SOLUTION ORAL
Qty: 30 ML | Refills: 0 | Status: SHIPPED | OUTPATIENT
Start: 2018-08-14 | End: 2018-08-14

## 2018-08-14 NOTE — TELEPHONE ENCOUNTER
Geraldo Toure from [employer] called, stated that the family has decided to keep her at the facility. They are asking if you will increase her Ativan.     Current dose is 0.5mg in AM & 0.75mg in PM    Also asking to add Morphine PRN    Please call Geraldo Toure @ [phone]

## 2018-08-14 NOTE — PROGRESS NOTES
Electrophysiology Outpatient Progress Note  Date of Service: 8/21/2018    Lexis Vance    7/19/1925    Referring Provider/PCP: Sanjay Alba DO  Primary Electrophysiologist: Previously Dr Liset Alcala     Chief Complaint: PPM management, Afib     SUBJECTIVE: Lexis Vance presents to the office today for a hospital follow up regarding Afib requiring a DCCV in the ED d/t hemodynamic instability, she was observed for 24 hours before she was discharged home in stable condition ( 8/10/2018). Last evaluated 4/27/2018 with Dr Liset Alcala     Her daughter is present on today's visit. She reports that she feels that her mother's afib episode was anxiety related. She has been taking ativan BID and now feels that her mother is too sedative and the patient agree's--- she will discuss this with Dr Ivonne Prater     Otherwise Ms Shubham Peters offers no other complaints     Review of Systems   Constitutional: + activity change and fatigue. Respiratory: Negative for cough, chest tightness and shortness of breath. Cardiovascular: Negative for chest pain, palpitations and leg swelling. Neurological: Negative for dizziness, syncope and light-headedness.    Denies any pain at device site      Patient Active Problem List    Diagnosis Date Noted    Benign essential tremor 06/15/2018    Fatigue 12/12/2017    Left upper arm pain 05/11/2017    AV block 02/24/2017    Seborrheic keratoses 11/07/2016    Gait difficulty 09/15/2015    CKD (chronic kidney disease) stage 3, GFR 30-59 ml/min 09/14/2015    Orthostatic hypotension 09/14/2015    Cardiac pacemaker in situ 09/25/2014     Overview Note:     Dual chamber, Medtronic  - implanted 2014      Valvular heart disease 07/12/2014    Atrial fibrillation ([address] 06/25/2014    Depression 06/25/2014    GERD (gastroesophageal reflux disease) 06/25/2014    Essential hypertension, benign 05/26/2014    Dementia 05/26/2014       Current Outpatient Prescriptions   Medication Sig Dispense Refill    nitrofurantoin, regurgitation present. Pericardial Effusion   No evidence of pericardial effusion. Aorta   Aortic root dimension within normal limits. Miscellaneous   Normal Inferior Vena Cava diameter and respiratory variation      Conclusions      Summary   Left ventricular size is grossly normal. Moderate left ventricular   concentric hypertrophy noted. Normal LV segmental wall motion. Ejection   fraction is visually estimated at 55%. There is doppler evidence of stage   I diastolic dysfunction. Mild-to-moderate aortic regurgitation is noted. Moderate tricuspid regurgitation. Signature      ----------------------------------------------------------------   Electronically signed by Yadiel Archer MD(Interpreting physician)   on 05/27/2014 08:24 PM   ----------------------------------------------------------------      Device Reprogramming   Make/Model:  Medtronic dual chamber PPM ( adapta)   Mode:  DDDR   Current Rhythm: AsVs  P wave: 2.0-2.8 mV. Impedance: 519 ohms. Threshold: 0.75 V @ 0.4 ms  RV R wave: 15-22 mV. Impedance: 462 ohms. Threshold: 0.75 V @ 0.4 ms  Pacing: A: 44.4%  RV: <0.1%  Battery Voltage/Longevity:  8.5 years     Arrhythmias: NS-VT ; longest 9 beats   Reprogramming included:  None    Overall device function is normal  All device programmable settings were evaluated per above and in the scanned document, along with iterative adjustments (capture thresholds) to assess and select the most appropriate final programming to provide for consistent delivery of the appropriate therapy and to verify function of the device. Assessment:     1. Paroxysmal AV block  -s/p PPM; see below     2. S/p PPM   - dual chamber; Medtronic   - Implanted 6/16/2014  - no recent remote's-- new care link ordered today   - normal device function; see device reprogramming above      3.  Atrial fibrillation  - discovered 2014  - CHADSVASC 3 ( Age, female, HTN)  -due to previous history of falls, not on

## 2018-08-14 NOTE — TELEPHONE ENCOUNTER
Received a rejection notice on Patients morphine. Called [phone] [facility] and they stated the [facility] is not covered there was nothing they could do. I explained this was end of life palliative care and they still stated there was nothing they could do. I called the pharmacist at [facility] and he stated that he had a different dose in stock. We sent Dr. Giselle Yeung the new dosage script and the family stated they would just pay villanueva. I called Dr. Giselle Yeung since it was after hours to let him know to please log on to his computer and push send on the script so she would have it. He stated that he would do this. I called Chioma Prcotor at [facility] to let her know that [facility] did not have any Morphine left in stock after this fill. If they saw that the patient was using it more frequently to make sure that she calls us to refill in enough time to allow 2 business days for ordering. Chioma Proctor stated that she could always use her pharmacy there if there was problem.

## 2018-08-15 NOTE — PROGRESS NOTES
See [facility] [place] report. Remote monitoring reviewed over a 90 day period. End of 90 day monitoring period date of service 8.14.2018.

## 2018-08-16 ENCOUNTER — TELEPHONE (OUTPATIENT)
Dept: FAMILY MEDICINE CLINIC | Age: 83
End: 2018-08-16

## 2018-08-16 NOTE — TELEPHONE ENCOUNTER
[address] called and pt has the start of UTI. Sent over Urine results. Contacted nursing home and told them to start her on Macrobid 100mg BID x10 days. They will call in to their own pharmacy.

## 2018-08-17 LAB
EKG ATRIAL RATE: 95 BPM
EKG P AXIS: 52 DEGREES
EKG P-R INTERVAL: 114 MS
EKG Q-T INTERVAL: 364 MS
EKG QRS DURATION: 134 MS
EKG QTC CALCULATION (BAZETT): 457 MS
EKG R AXIS: -4 DEGREES
EKG T AXIS: 157 DEGREES
EKG VENTRICULAR RATE: 95 BPM

## 2018-08-21 ENCOUNTER — OFFICE VISIT (OUTPATIENT)
Dept: NON INVASIVE DIAGNOSTICS | Age: 83
End: 2018-08-21
Payer: MEDICARE

## 2018-08-21 VITALS
RESPIRATION RATE: 16 BRPM | SYSTOLIC BLOOD PRESSURE: 92 MMHG | DIASTOLIC BLOOD PRESSURE: 50 MMHG | BODY MASS INDEX: 23.36 KG/M2 | WEIGHT: 119 LBS | HEIGHT: 60 IN | HEART RATE: 83 BPM

## 2018-08-21 DIAGNOSIS — I44.30 AV BLOCK: Primary | ICD-10-CM

## 2018-08-21 DIAGNOSIS — Z95.0 CARDIAC PACEMAKER IN SITU: ICD-10-CM

## 2018-08-21 DIAGNOSIS — I48.0 PAROXYSMAL ATRIAL FIBRILLATION (HCC): ICD-10-CM

## 2018-08-21 PROCEDURE — G8420 CALC BMI NORM PARAMETERS: HCPCS | Performed by: NURSE PRACTITIONER

## 2018-08-21 PROCEDURE — 99213 OFFICE O/P EST LOW 20 MIN: CPT | Performed by: NURSE PRACTITIONER

## 2018-08-21 PROCEDURE — 1036F TOBACCO NON-USER: CPT | Performed by: NURSE PRACTITIONER

## 2018-08-21 PROCEDURE — G8427 DOCREV CUR MEDS BY ELIG CLIN: HCPCS | Performed by: NURSE PRACTITIONER

## 2018-08-21 PROCEDURE — 4040F PNEUMOC VAC/ADMIN/RCVD: CPT | Performed by: NURSE PRACTITIONER

## 2018-08-21 PROCEDURE — 1090F PRES/ABSN URINE INCON ASSESS: CPT | Performed by: NURSE PRACTITIONER

## 2018-08-21 PROCEDURE — 93280 PM DEVICE PROGR EVAL DUAL: CPT | Performed by: NURSE PRACTITIONER

## 2018-08-21 PROCEDURE — 1101F PT FALLS ASSESS-DOCD LE1/YR: CPT | Performed by: NURSE PRACTITIONER

## 2018-08-21 PROCEDURE — 1123F ACP DISCUSS/DSCN MKR DOCD: CPT | Performed by: NURSE PRACTITIONER

## 2018-08-21 RX ORDER — NITROFURANTOIN 25; 75 MG/1; MG/1
100 CAPSULE ORAL 2 TIMES DAILY
COMMUNITY
End: 2018-12-21 | Stop reason: SDUPTHER

## 2018-08-21 RX ORDER — IBUPROFEN 200 MG
200 TABLET ORAL EVERY 6 HOURS PRN
COMMUNITY

## 2018-08-21 RX ORDER — MORPHINE SULFATE 10 MG/5ML
20 SOLUTION ORAL
COMMUNITY
End: 2019-10-28

## 2018-08-23 ENCOUNTER — TELEPHONE (OUTPATIENT)
Dept: NON INVASIVE DIAGNOSTICS | Age: 83
End: 2018-08-23

## 2018-08-23 NOTE — TELEPHONE ENCOUNTER
Spoke to [employer] at [employer] advised her of the recommendations to hold if the SBP is less than 90. She verbalized understanding.

## 2018-09-18 ENCOUNTER — OFFICE VISIT (OUTPATIENT)
Dept: FAMILY MEDICINE CLINIC | Age: 83
End: 2018-09-18
Payer: MEDICARE

## 2018-09-18 VITALS
WEIGHT: 115 LBS | BODY MASS INDEX: 22.58 KG/M2 | RESPIRATION RATE: 18 BRPM | HEIGHT: 60 IN | TEMPERATURE: 98.2 F | SYSTOLIC BLOOD PRESSURE: 114 MMHG | HEART RATE: 64 BPM | DIASTOLIC BLOOD PRESSURE: 80 MMHG | OXYGEN SATURATION: 96 %

## 2018-09-18 DIAGNOSIS — Z00.00 ROUTINE GENERAL MEDICAL EXAMINATION AT A HEALTH CARE FACILITY: Primary | ICD-10-CM

## 2018-09-18 PROCEDURE — G0439 PPPS, SUBSEQ VISIT: HCPCS | Performed by: FAMILY MEDICINE

## 2018-09-18 PROCEDURE — 4040F PNEUMOC VAC/ADMIN/RCVD: CPT | Performed by: FAMILY MEDICINE

## 2018-09-18 PROCEDURE — G0008 ADMIN INFLUENZA VIRUS VAC: HCPCS | Performed by: FAMILY MEDICINE

## 2018-09-18 PROCEDURE — G0009 ADMIN PNEUMOCOCCAL VACCINE: HCPCS | Performed by: FAMILY MEDICINE

## 2018-09-18 PROCEDURE — 90662 IIV NO PRSV INCREASED AG IM: CPT | Performed by: FAMILY MEDICINE

## 2018-09-18 PROCEDURE — 90732 PPSV23 VACC 2 YRS+ SUBQ/IM: CPT | Performed by: FAMILY MEDICINE

## 2018-09-18 ASSESSMENT — LIFESTYLE VARIABLES: HOW OFTEN DO YOU HAVE A DRINK CONTAINING ALCOHOL: 0

## 2018-09-18 ASSESSMENT — PATIENT HEALTH QUESTIONNAIRE - PHQ9
SUM OF ALL RESPONSES TO PHQ QUESTIONS 1-9: 1
SUM OF ALL RESPONSES TO PHQ QUESTIONS 1-9: 1

## 2018-09-18 ASSESSMENT — ANXIETY QUESTIONNAIRES: GAD7 TOTAL SCORE: 0

## 2018-09-18 NOTE — PROGRESS NOTES
Provider, MD   loratadine (CLARITIN) 10 MG tablet Take 10 mg by mouth every morning  Yes Historical Provider, MD   gabapentin (NEURONTIN) 100 MG capsule Take 100 mg by mouth every morning.  . Yes Historical Provider, MD   potassium chloride SA (K-DUR;KLOR-CON M) 20 MEQ tablet Take 1 tablet by mouth 2 times daily (with meals) Yes Wayne F Hieu, DO   acetaminophen 650 MG TABS Take 650 mg by mouth every 4 hours as needed Yes Wayne F Hieu, DO   albuterol (PROVENTIL HFA;VENTOLIN HFA) 108 (90 BASE) MCG/ACT inhaler Inhale 2 puffs into the lungs 4 times daily Yes Wayne F Hieu, DO   memantine (NAMENDA XR) 7 MG CP24 capsule Take 7 mg by mouth nightly Yes Historical Provider, MD   pantoprazole (PROTONIX) 40 MG tablet Take 40 mg by mouth every morning  Yes Historical Provider, MD   aspirin 81 MG tablet Take 81 mg by mouth every morning  Yes Historical Provider, MD   Multiple Vitamins-Minerals (EYE VITAMINS) TABS Take 1 tablet by mouth every morning  Yes Historical Provider, MD       Past Medical History:   Diagnosis Date    Acid reflux disease     Atrial fibrillation ([address]     h/o    AVB (atrioventricular block)     COPD (chronic obstructive pulmonary disease) ([address]     Dementia     Depression     Hypotension     Intracerebral hematoma ([address] 2009    d/t fall    Nausea & vomiting     Seizure disorder ([address] 2009    related to intracranial bleed    Syncope and collapse 5/25/2014     Past Surgical History:   Procedure Laterality Date    BRAIN SURGERY  2009    icb    BREAST SURGERY      cysts    CARDIAC CATHETERIZATION  2001 or 2012    CARDIOVERSION  08/09/2018    CATARACT REMOVAL WITH IMPLANT Bilateral 10 yrs ago    CHOLECYSTECTOMY      ERCP  09/30/2014    With stent placement    ERCP  11/14/14    PACEMAKER INSERTION Left 6-    Dr. Kristian Engle       Family History   Problem Relation Age of Onset    Cancer Brother     Cancer Other        CareTeam (Including outside providers/suppliers regularly involved in providing care):   Patient Care Team:  Cornelio Lieberman DO as PCP - General (Family Medicine)  Cornelio Lieberman DO as PCP - [facility] Attributed Provider  Kathryn Camacho MD as Consulting Physician (Cardiac Electrophysiology)    Wt Readings from Last 3 Encounters:   09/18/18 115 lb (52.2 kg)   08/21/18 119 lb (54 kg)   08/10/18 115 lb (52.2 kg)     Vitals:    09/18/18 1000   BP: 114/80   Pulse: 64   Resp: 18   Temp: 98.2 °F (36.8 °C)   SpO2: 96%   Weight: 115 lb (52.2 kg)   Height: 5' (1.524 m)     Body mass index is 22.46 kg/m². General Appearance: alert and oriented to person, place and time, well developed and well- nourished, in no acute distress  Skin: warm and dry, no rash or erythema  Head: normocephalic and atraumatic  Eyes: pupils equal, round, and reactive to light, extraocular eye movements intact, conjunctivae normal  ENT: tympanic membrane, external ear and ear canal normal bilaterally, nose without deformity, nasal mucosa and turbinates normal without polyps  Neck: supple and non-tender without mass, no thyromegaly or thyroid nodules, no cervical lymphadenopathy  Pulmonary/Chest: clear to auscultation bilaterally- no wheezes, rales or rhonchi, normal air movement, no respiratory distress  Cardiovascular: normal rate, regular rhythm, normal S1 and S2, no murmurs, rubs, clicks, or gallops, distal pulses intact, no carotid bruits  Abdomen: soft, non-tender, non-distended, normal bowel sounds, no masses or organomegaly  Extremities: no cyanosis, clubbing or edema  Musculoskeletal: normal range of motion, no joint swelling, deformity or tenderness  Neurologic: reflexes normal and symmetric, no cranial nerve deficit, gait, coordination and speech normal    Patient's complete Health Risk Assessment and screening values have been reviewed and are found in Flowsheets.  The following problems were reviewed today and where indicated follow up appointments were made and/or

## 2018-10-05 DIAGNOSIS — F41.9 ANXIETY: Primary | ICD-10-CM

## 2018-10-05 RX ORDER — LORAZEPAM 1 MG/1
1 TABLET ORAL EVERY 6 HOURS PRN
Qty: 30 TABLET | Refills: 1 | Status: SHIPPED | OUTPATIENT
Start: 2018-10-05 | End: 2018-11-04

## 2018-11-01 ENCOUNTER — TELEPHONE (OUTPATIENT)
Dept: FAMILY MEDICINE CLINIC | Age: 83
End: 2018-11-01

## 2018-11-01 RX ORDER — CEFDINIR 300 MG/1
300 CAPSULE ORAL DAILY
Qty: 20 CAPSULE | Refills: 0 | Status: SHIPPED | OUTPATIENT
Start: 2018-11-01 | End: 2018-11-11

## 2018-11-01 RX ORDER — CEFDINIR 300 MG/1
300 CAPSULE ORAL DAILY
Qty: 10 CAPSULE | Refills: 0 | Status: SHIPPED | OUTPATIENT
Start: 2018-11-01 | End: 2018-11-11

## 2018-11-13 ENCOUNTER — NURSE ONLY (OUTPATIENT)
Dept: NON INVASIVE DIAGNOSTICS | Age: 83
End: 2018-11-13
Payer: MEDICARE

## 2018-11-13 ENCOUNTER — TELEPHONE (OUTPATIENT)
Dept: NON INVASIVE DIAGNOSTICS | Age: 83
End: 2018-11-13

## 2018-11-13 DIAGNOSIS — Z95.0 CARDIAC PACEMAKER IN SITU: Primary | ICD-10-CM

## 2018-11-13 DIAGNOSIS — I44.30 AV BLOCK: ICD-10-CM

## 2018-11-13 DIAGNOSIS — I48.0 PAROXYSMAL ATRIAL FIBRILLATION (HCC): ICD-10-CM

## 2018-11-13 PROCEDURE — 93294 REM INTERROG EVL PM/LDLS PM: CPT | Performed by: INTERNAL MEDICINE

## 2018-11-13 PROCEDURE — 93296 REM INTERROG EVL PM/IDS: CPT | Performed by: INTERNAL MEDICINE

## 2018-11-16 ENCOUNTER — TELEPHONE (OUTPATIENT)
Dept: NON INVASIVE DIAGNOSTICS | Age: 83
End: 2018-11-16

## 2018-11-16 NOTE — TELEPHONE ENCOUNTER
We have received your remote transmission. Our staff will contact you if there is anything that needs to be discussed. Your next appointment is November 28, 2018 OV with CK. Patient's daughter verbalized understanding.

## 2018-11-30 DIAGNOSIS — F41.9 ANXIETY: Primary | ICD-10-CM

## 2018-11-30 RX ORDER — LORAZEPAM 1 MG/1
1 TABLET ORAL NIGHTLY
Qty: 30 TABLET | Refills: 1 | Status: SHIPPED | OUTPATIENT
Start: 2018-11-30 | End: 2019-01-30 | Stop reason: SDUPTHER

## 2018-12-10 ENCOUNTER — TELEPHONE (OUTPATIENT)
Dept: FAMILY MEDICINE CLINIC | Age: 83
End: 2018-12-10

## 2018-12-21 ENCOUNTER — OFFICE VISIT (OUTPATIENT)
Dept: FAMILY MEDICINE CLINIC | Age: 83
End: 2018-12-21
Payer: MEDICARE

## 2018-12-21 ENCOUNTER — HOSPITAL ENCOUNTER (OUTPATIENT)
Age: 83
Discharge: HOME | End: 2018-12-23
Payer: MEDICARE

## 2018-12-21 VITALS
BODY MASS INDEX: 24.35 KG/M2 | HEART RATE: 81 BPM | SYSTOLIC BLOOD PRESSURE: 136 MMHG | HEIGHT: 60 IN | OXYGEN SATURATION: 96 % | RESPIRATION RATE: 16 BRPM | DIASTOLIC BLOOD PRESSURE: 72 MMHG | WEIGHT: 124 LBS

## 2018-12-21 DIAGNOSIS — I95.1 ORTHOSTATIC HYPOTENSION: Primary | ICD-10-CM

## 2018-12-21 DIAGNOSIS — N30.00 ACUTE CYSTITIS WITHOUT HEMATURIA: ICD-10-CM

## 2018-12-21 DIAGNOSIS — I10 ESSENTIAL HYPERTENSION, BENIGN: Chronic | ICD-10-CM

## 2018-12-21 DIAGNOSIS — G25.0 BENIGN ESSENTIAL TREMOR: ICD-10-CM

## 2018-12-21 DIAGNOSIS — F03.90 DEMENTIA WITHOUT BEHAVIORAL DISTURBANCE, UNSPECIFIED DEMENTIA TYPE: Chronic | ICD-10-CM

## 2018-12-21 LAB
ALBUMIN SERPL-MCNC: 4.1 G/DL (ref 3.5–5.2)
ALP BLD-CCNC: 129 U/L (ref 35–104)
ALT SERPL-CCNC: 12 U/L (ref 0–32)
ANION GAP SERPL CALCULATED.3IONS-SCNC: 13 MMOL/L (ref 7–16)
AST SERPL-CCNC: 18 U/L (ref 0–31)
BILIRUB SERPL-MCNC: 0.4 MG/DL (ref 0–1.2)
BILIRUBIN, POC: ABNORMAL
BLOOD URINE, POC: ABNORMAL
BUN BLDV-MCNC: 21 MG/DL (ref 8–23)
CALCIUM SERPL-MCNC: 9.4 MG/DL (ref 8.6–10.2)
CHLORIDE BLD-SCNC: 102 MMOL/L (ref 98–107)
CLARITY, POC: ABNORMAL
CO2: 27 MMOL/L (ref 22–29)
COLOR, POC: YELLOW
CREAT SERPL-MCNC: 1.1 MG/DL (ref 0.5–1)
GFR AFRICAN AMERICAN: 56
GFR NON-AFRICAN AMERICAN: 46 ML/MIN/1.73
GLUCOSE BLD-MCNC: 88 MG/DL (ref 74–99)
GLUCOSE URINE, POC: ABNORMAL
HCT VFR BLD CALC: 40.4 % (ref 34–48)
HEMOGLOBIN: 13 G/DL (ref 11.5–15.5)
KETONES, POC: ABNORMAL
LEUKOCYTE EST, POC: ABNORMAL
MCH RBC QN AUTO: 32.7 PG (ref 26–35)
MCHC RBC AUTO-ENTMCNC: 32.2 % (ref 32–34.5)
MCV RBC AUTO: 101.5 FL (ref 80–99.9)
NITRITE, POC: ABNORMAL
PDW BLD-RTO: 12.5 FL (ref 11.5–15)
PH, POC: 6
PLATELET # BLD: 168 E9/L (ref 130–450)
PMV BLD AUTO: 10.7 FL (ref 7–12)
POTASSIUM SERPL-SCNC: 5.7 MMOL/L (ref 3.5–5)
PROTEIN, POC: ABNORMAL
RBC # BLD: 3.98 E12/L (ref 3.5–5.5)
SODIUM BLD-SCNC: 142 MMOL/L (ref 132–146)
SPECIFIC GRAVITY, POC: 1.02
TOTAL PROTEIN: 7.1 G/DL (ref 6.4–8.3)
TSH SERPL DL<=0.05 MIU/L-ACNC: 4.67 UIU/ML (ref 0.27–4.2)
UROBILINOGEN, POC: ABNORMAL
WBC # BLD: 7.4 E9/L (ref 4.5–11.5)

## 2018-12-21 PROCEDURE — 99214 OFFICE O/P EST MOD 30 MIN: CPT | Performed by: FAMILY MEDICINE

## 2018-12-21 PROCEDURE — 4040F PNEUMOC VAC/ADMIN/RCVD: CPT | Performed by: FAMILY MEDICINE

## 2018-12-21 PROCEDURE — 1090F PRES/ABSN URINE INCON ASSESS: CPT | Performed by: FAMILY MEDICINE

## 2018-12-21 PROCEDURE — 36415 COLL VENOUS BLD VENIPUNCTURE: CPT | Performed by: FAMILY MEDICINE

## 2018-12-21 PROCEDURE — 87088 URINE BACTERIA CULTURE: CPT

## 2018-12-21 PROCEDURE — G8420 CALC BMI NORM PARAMETERS: HCPCS | Performed by: FAMILY MEDICINE

## 2018-12-21 PROCEDURE — 87186 SC STD MICRODIL/AGAR DIL: CPT

## 2018-12-21 PROCEDURE — 1123F ACP DISCUSS/DSCN MKR DOCD: CPT | Performed by: FAMILY MEDICINE

## 2018-12-21 PROCEDURE — 84443 ASSAY THYROID STIM HORMONE: CPT

## 2018-12-21 PROCEDURE — 80053 COMPREHEN METABOLIC PANEL: CPT

## 2018-12-21 PROCEDURE — G8427 DOCREV CUR MEDS BY ELIG CLIN: HCPCS | Performed by: FAMILY MEDICINE

## 2018-12-21 PROCEDURE — 87077 CULTURE AEROBIC IDENTIFY: CPT

## 2018-12-21 PROCEDURE — 81002 URINALYSIS NONAUTO W/O SCOPE: CPT | Performed by: FAMILY MEDICINE

## 2018-12-21 PROCEDURE — 1036F TOBACCO NON-USER: CPT | Performed by: FAMILY MEDICINE

## 2018-12-21 PROCEDURE — 85027 COMPLETE CBC AUTOMATED: CPT

## 2018-12-21 PROCEDURE — 1101F PT FALLS ASSESS-DOCD LE1/YR: CPT | Performed by: FAMILY MEDICINE

## 2018-12-21 PROCEDURE — G8482 FLU IMMUNIZE ORDER/ADMIN: HCPCS | Performed by: FAMILY MEDICINE

## 2018-12-21 RX ORDER — NITROFURANTOIN 25; 75 MG/1; MG/1
100 CAPSULE ORAL 2 TIMES DAILY
Qty: 20 CAPSULE | Refills: 0 | Status: SHIPPED | OUTPATIENT
Start: 2018-12-21 | End: 2018-12-31

## 2018-12-21 ASSESSMENT — ENCOUNTER SYMPTOMS
EYE REDNESS: 0
COUGH: 0
CHEST TIGHTNESS: 0
SHORTNESS OF BREATH: 0
APNEA: 0
NAUSEA: 0
RHINORRHEA: 0
SINUS PRESSURE: 0
EYE PAIN: 0
DIARRHEA: 0
ABDOMINAL PAIN: 0
BACK PAIN: 0
VOMITING: 0
SORE THROAT: 0
CONSTIPATION: 0
COLOR CHANGE: 0
BLOOD IN STOOL: 0
EYE ITCHING: 0
WHEEZING: 0

## 2018-12-21 ASSESSMENT — PATIENT HEALTH QUESTIONNAIRE - PHQ9
SUM OF ALL RESPONSES TO PHQ QUESTIONS 1-9: 2
1. LITTLE INTEREST OR PLEASURE IN DOING THINGS: 0
SUM OF ALL RESPONSES TO PHQ9 QUESTIONS 1 & 2: 2
SUM OF ALL RESPONSES TO PHQ QUESTIONS 1-9: 2
2. FEELING DOWN, DEPRESSED OR HOPELESS: 2

## 2018-12-21 NOTE — PROGRESS NOTES
PARoxetine (PAXIL) 10 MG tablet Take 10 mg by mouth every morning      loratadine (CLARITIN) 10 MG tablet Take 10 mg by mouth every morning       gabapentin (NEURONTIN) 100 MG capsule Take 100 mg by mouth every morning. [place] potassium chloride SA (K-DUR;KLOR-CON M) 20 MEQ tablet Take 1 tablet by mouth 2 times daily (with meals) 60 tablet 3    acetaminophen 650 MG TABS Take 650 mg by mouth every 4 hours as needed 120 tablet 3    albuterol (PROVENTIL HFA;VENTOLIN HFA) 108 (90 BASE) MCG/ACT inhaler Inhale 2 puffs into the lungs 4 times daily 1 Inhaler 3    memantine (NAMENDA XR) 7 MG CP24 capsule Take 7 mg by mouth nightly      pantoprazole (PROTONIX) 40 MG tablet Take 40 mg by mouth every morning       aspirin 81 MG tablet Take 81 mg by mouth every morning       Multiple Vitamins-Minerals (EYE VITAMINS) TABS Take 1 tablet by mouth every morning        No current facility-administered medications for this visit. No Known Allergies    Social History     Social History    Marital status:      Spouse name: N/A    Number of children: N/A    Years of education: N/A     Occupational History    retired      Social History Main Topics    Smoking status: Never Smoker    Smokeless tobacco: Never Used    Alcohol use No    Drug use: No    Sexual activity: No     Other Topics Concern    None     Social History Narrative    None       Family History   Problem Relation Age of Onset    Cancer Brother     Cancer Other           Review of Systems   Constitutional: Positive for malaise/fatigue. Negative for activity change, appetite change, chills, diaphoresis, fatigue and fever. HENT: Negative for congestion, ear pain, hearing loss, nosebleeds, rhinorrhea, sinus pressure and sore throat. Eyes: Negative for pain, redness, itching and visual disturbance. Respiratory: Negative for apnea, cough, chest tightness, shortness of breath and wheezing.     Cardiovascular: Negative for chest pain, palpitations, leg swelling and PND. Gastrointestinal: Negative for abdominal pain, blood in stool, constipation, diarrhea, nausea and vomiting. Endocrine: Negative. Genitourinary: Negative for decreased urine volume, difficulty urinating, dysuria, frequency, hematuria and urgency. Musculoskeletal: Negative for arthralgias, back pain, gait problem, joint swelling, myalgias and neck pain. Skin: Negative for color change and rash. Allergic/Immunologic: Negative for environmental allergies and food allergies. Neurological: Positive for weakness. Negative for dizziness, light-headedness, numbness and headaches. Hematological: Negative for adenopathy. Does not bruise/bleed easily. Psychiatric/Behavioral: Negative for behavioral problems, dysphoric mood and sleep disturbance. The patient is not nervous/anxious and is not hyperactive. All other systems reviewed and are negative. /72   Pulse 81   Resp 16   Ht 5' (1.524 m)   Wt 124 lb (56.2 kg)   SpO2 96%   BMI 24.22 kg/m²     Physical Exam   Constitutional: She is oriented to person, place, and time. She appears well-developed and well-nourished. HENT:   Head: Normocephalic and atraumatic. Right Ear: External ear normal.   Left Ear: External ear normal.   Nose: Nose normal.   Mouth/Throat: Oropharynx is clear and moist.   Eyes: Pupils are equal, round, and reactive to light. Conjunctivae and EOM are normal. No scleral icterus. Neck: Normal range of motion. Neck supple. No thyromegaly present. Cardiovascular: Normal rate, regular rhythm and normal heart sounds. No murmur heard. Pulmonary/Chest: Effort normal and breath sounds normal. No respiratory distress. She has no wheezes. She has no rales. Abdominal: Soft. Bowel sounds are normal. She exhibits no distension. There is no tenderness. Musculoskeletal: Normal range of motion. She exhibits no edema or tenderness. Lymphadenopathy:     She has no cervical adenopathy. Neurological: She is alert and oriented to person, place, and time. She has normal reflexes. She displays normal reflexes. No cranial nerve deficit. Skin: Skin is warm and dry. No rash noted. No erythema. Psychiatric: She has a normal mood and affect. Nursing note and vitals reviewed. ASSESSMENT/PLAN:    Patient Active Problem List   Diagnosis    Essential hypertension, benign    Dementia    Atrial fibrillation (HCC)    Depression    GERD (gastroesophageal reflux disease)    Valvular heart disease    Cardiac pacemaker in situ    CKD (chronic kidney disease) stage 3, GFR 30-59 ml/min (HCC)    Orthostatic hypotension    Gait difficulty    Seborrheic keratoses    AV block    Left upper arm pain    Fatigue    Benign essential tremor    Acute cystitis without hematuria       Efrain Abraham was seen today for annual exam, shaking and other. Diagnoses and all orders for this visit:    Orthostatic hypotension    Essential hypertension, benign  -     CBC; Future  -     Comprehensive Metabolic Panel; Future  -     TSH without Reflex; Future    Dementia without behavioral disturbance, unspecified dementia type    Benign essential tremor    Acute cystitis without hematuria  -     POCT Urinalysis no Micro  -     Urine Culture; Future    Other orders  -     nitrofurantoin, macrocrystal-monohydrate, (MACROBID) 100 MG capsule; Take 1 capsule by mouth 2 times daily for 10 days          Return in about 3 months (around 3/21/2019) for Follow up HTN, Recheck Meds, Recheck labs.         Chantelle Linn DO  12/21/2018  11:30 AM

## 2018-12-23 LAB
ORGANISM: ABNORMAL
URINE CULTURE, ROUTINE: ABNORMAL
URINE CULTURE, ROUTINE: ABNORMAL

## 2018-12-26 ENCOUNTER — TELEPHONE (OUTPATIENT)
Dept: FAMILY MEDICINE CLINIC | Age: 83
End: 2018-12-26

## 2018-12-26 RX ORDER — CEPHALEXIN 250 MG/1
250 CAPSULE ORAL 2 TIMES DAILY
Qty: 28 CAPSULE | Refills: 0 | Status: SHIPPED | OUTPATIENT
Start: 2018-12-26 | End: 2019-03-26

## 2018-12-26 NOTE — TELEPHONE ENCOUNTER
Pt needs handicap placard for when shes with her daughter, please fax to [facility] when ready.

## 2019-01-07 ENCOUNTER — TELEPHONE (OUTPATIENT)
Dept: FAMILY MEDICINE CLINIC | Age: 84
End: 2019-01-07

## 2019-01-09 ENCOUNTER — HOSPITAL ENCOUNTER (OUTPATIENT)
Age: 84
Discharge: HOME | End: 2019-01-11
Payer: MEDICARE

## 2019-01-09 ENCOUNTER — NURSE ONLY (OUTPATIENT)
Dept: FAMILY MEDICINE CLINIC | Age: 84
End: 2019-01-09
Payer: MEDICARE

## 2019-01-09 DIAGNOSIS — E87.5 HYPERKALEMIA: ICD-10-CM

## 2019-01-09 DIAGNOSIS — E87.5 HYPERKALEMIA: Primary | ICD-10-CM

## 2019-01-09 LAB — POTASSIUM SERPL-SCNC: 5.3 MMOL/L (ref 3.5–5)

## 2019-01-09 PROCEDURE — 36415 COLL VENOUS BLD VENIPUNCTURE: CPT | Performed by: FAMILY MEDICINE

## 2019-01-09 PROCEDURE — 84132 ASSAY OF SERUM POTASSIUM: CPT

## 2019-01-30 DIAGNOSIS — F41.9 ANXIETY: ICD-10-CM

## 2019-01-31 RX ORDER — LORAZEPAM 1 MG/1
1 TABLET ORAL NIGHTLY
Qty: 30 TABLET | Refills: 1 | OUTPATIENT
Start: 2019-01-31 | End: 2019-03-02

## 2019-01-31 RX ORDER — LORAZEPAM 1 MG/1
TABLET ORAL
Qty: 30 TABLET | Refills: 4 | Status: SHIPPED | OUTPATIENT
Start: 2019-01-31 | End: 2019-06-17 | Stop reason: SDUPTHER

## 2019-02-12 DIAGNOSIS — F41.9 ANXIETY: Primary | ICD-10-CM

## 2019-02-12 RX ORDER — LORAZEPAM 0.5 MG/1
TABLET ORAL
Qty: 30 TABLET | Refills: 0 | Status: SHIPPED | OUTPATIENT
Start: 2019-02-12 | End: 2019-03-14

## 2019-03-26 ENCOUNTER — OFFICE VISIT (OUTPATIENT)
Dept: FAMILY MEDICINE CLINIC | Age: 84
End: 2019-03-26
Payer: MEDICARE

## 2019-03-26 VITALS
BODY MASS INDEX: 24.94 KG/M2 | DIASTOLIC BLOOD PRESSURE: 60 MMHG | HEART RATE: 87 BPM | OXYGEN SATURATION: 92 % | WEIGHT: 127 LBS | RESPIRATION RATE: 16 BRPM | HEIGHT: 60 IN | SYSTOLIC BLOOD PRESSURE: 116 MMHG

## 2019-03-26 DIAGNOSIS — F03.90 DEMENTIA WITHOUT BEHAVIORAL DISTURBANCE, UNSPECIFIED DEMENTIA TYPE: ICD-10-CM

## 2019-03-26 DIAGNOSIS — R26.9 GAIT DIFFICULTY: ICD-10-CM

## 2019-03-26 DIAGNOSIS — I10 ESSENTIAL HYPERTENSION, BENIGN: Primary | Chronic | ICD-10-CM

## 2019-03-26 DIAGNOSIS — I48.0 PAROXYSMAL ATRIAL FIBRILLATION (HCC): ICD-10-CM

## 2019-03-26 DIAGNOSIS — F32.0 CURRENT MILD EPISODE OF MAJOR DEPRESSIVE DISORDER, UNSPECIFIED WHETHER RECURRENT (HCC): Chronic | ICD-10-CM

## 2019-03-26 DIAGNOSIS — F32.5 MAJOR DEPRESSIVE DISORDER, SINGLE EPISODE, IN FULL REMISSION (HCC): ICD-10-CM

## 2019-03-26 PROCEDURE — G8482 FLU IMMUNIZE ORDER/ADMIN: HCPCS | Performed by: FAMILY MEDICINE

## 2019-03-26 PROCEDURE — 1090F PRES/ABSN URINE INCON ASSESS: CPT | Performed by: FAMILY MEDICINE

## 2019-03-26 PROCEDURE — G8510 SCR DEP NEG, NO PLAN REQD: HCPCS | Performed by: FAMILY MEDICINE

## 2019-03-26 PROCEDURE — 1036F TOBACCO NON-USER: CPT | Performed by: FAMILY MEDICINE

## 2019-03-26 PROCEDURE — 4040F PNEUMOC VAC/ADMIN/RCVD: CPT | Performed by: FAMILY MEDICINE

## 2019-03-26 PROCEDURE — 1101F PT FALLS ASSESS-DOCD LE1/YR: CPT | Performed by: FAMILY MEDICINE

## 2019-03-26 PROCEDURE — 1123F ACP DISCUSS/DSCN MKR DOCD: CPT | Performed by: FAMILY MEDICINE

## 2019-03-26 PROCEDURE — G8427 DOCREV CUR MEDS BY ELIG CLIN: HCPCS | Performed by: FAMILY MEDICINE

## 2019-03-26 PROCEDURE — G8420 CALC BMI NORM PARAMETERS: HCPCS | Performed by: FAMILY MEDICINE

## 2019-03-26 PROCEDURE — 99214 OFFICE O/P EST MOD 30 MIN: CPT | Performed by: FAMILY MEDICINE

## 2019-03-26 ASSESSMENT — ENCOUNTER SYMPTOMS
WHEEZING: 0
BACK PAIN: 0
SINUS PRESSURE: 0
SORE THROAT: 0
EYE PAIN: 0
COUGH: 0
EYE ITCHING: 0
RHINORRHEA: 0
COLOR CHANGE: 0
VOMITING: 0
ABDOMINAL PAIN: 0
BLOOD IN STOOL: 0
APNEA: 0
CHEST TIGHTNESS: 0
EYE REDNESS: 0
SHORTNESS OF BREATH: 0
CONSTIPATION: 0
DIARRHEA: 0
NAUSEA: 0

## 2019-03-26 ASSESSMENT — PATIENT HEALTH QUESTIONNAIRE - PHQ9
2. FEELING DOWN, DEPRESSED OR HOPELESS: 2
SUM OF ALL RESPONSES TO PHQ QUESTIONS 1-9: 2
SUM OF ALL RESPONSES TO PHQ9 QUESTIONS 1 & 2: 2
1. LITTLE INTEREST OR PLEASURE IN DOING THINGS: 0
SUM OF ALL RESPONSES TO PHQ QUESTIONS 1-9: 2

## 2019-06-10 ENCOUNTER — TELEPHONE (OUTPATIENT)
Dept: FAMILY MEDICINE CLINIC | Age: 84
End: 2019-06-10

## 2019-06-10 NOTE — TELEPHONE ENCOUNTER
Annette Schwab calling, states that she took mom to eye doctor for an injection today and due to patient feeling weak and dizzy they declined treatment. Daughter states that mom told her she felt like she was going in to AFIB, she felt dizzy and had weakness in her legs from the hips down. Mom is refusing emergency which was advised due to symptoms and patient history. Per Dr. Kristy Lizarraga, since patient refusing emergency advised to take patient back to nursing home for evaluation of vital signs. The nursing home is to contact office for follow up. Daughter voiced understanding.

## 2019-06-17 DIAGNOSIS — F41.9 ANXIETY: ICD-10-CM

## 2019-06-17 RX ORDER — LORAZEPAM 1 MG/1
TABLET ORAL
Qty: 30 TABLET | Refills: 0 | Status: SHIPPED | OUTPATIENT
Start: 2019-06-17 | End: 2019-07-15 | Stop reason: SDUPTHER

## 2019-07-15 DIAGNOSIS — F41.9 ANXIETY: ICD-10-CM

## 2019-07-15 RX ORDER — LORAZEPAM 1 MG/1
TABLET ORAL
Qty: 30 TABLET | Refills: 5 | OUTPATIENT
Start: 2019-07-15 | End: 2019-08-15

## 2019-07-15 RX ORDER — LORAZEPAM 1 MG/1
TABLET ORAL
Qty: 30 TABLET | Refills: 0 | Status: SHIPPED | OUTPATIENT
Start: 2019-07-15 | End: 2019-08-12 | Stop reason: SDUPTHER

## 2019-07-26 ENCOUNTER — HOSPITAL ENCOUNTER (OUTPATIENT)
Age: 84
Discharge: HOME | End: 2019-07-28
Payer: MEDICARE

## 2019-07-26 ENCOUNTER — OFFICE VISIT (OUTPATIENT)
Dept: FAMILY MEDICINE CLINIC | Age: 84
End: 2019-07-26
Payer: MEDICARE

## 2019-07-26 VITALS
HEART RATE: 76 BPM | SYSTOLIC BLOOD PRESSURE: 120 MMHG | HEIGHT: 60 IN | OXYGEN SATURATION: 90 % | RESPIRATION RATE: 16 BRPM | WEIGHT: 126 LBS | DIASTOLIC BLOOD PRESSURE: 64 MMHG | BODY MASS INDEX: 24.74 KG/M2

## 2019-07-26 DIAGNOSIS — R53.83 FATIGUE, UNSPECIFIED TYPE: ICD-10-CM

## 2019-07-26 DIAGNOSIS — I10 ESSENTIAL HYPERTENSION, BENIGN: Chronic | ICD-10-CM

## 2019-07-26 DIAGNOSIS — I48.0 PAROXYSMAL ATRIAL FIBRILLATION (HCC): Primary | ICD-10-CM

## 2019-07-26 DIAGNOSIS — R26.9 GAIT DIFFICULTY: ICD-10-CM

## 2019-07-26 DIAGNOSIS — G62.9 PERIPHERAL POLYNEUROPATHY: ICD-10-CM

## 2019-07-26 DIAGNOSIS — F03.90 DEMENTIA WITHOUT BEHAVIORAL DISTURBANCE, UNSPECIFIED DEMENTIA TYPE: Chronic | ICD-10-CM

## 2019-07-26 LAB
ALBUMIN SERPL-MCNC: 4.1 G/DL (ref 3.5–5.2)
ALP BLD-CCNC: 187 U/L (ref 35–104)
ALT SERPL-CCNC: 7 U/L (ref 0–32)
ANION GAP SERPL CALCULATED.3IONS-SCNC: 16 MMOL/L (ref 7–16)
AST SERPL-CCNC: 18 U/L (ref 0–31)
BILIRUB SERPL-MCNC: 0.4 MG/DL (ref 0–1.2)
BUN BLDV-MCNC: 22 MG/DL (ref 8–23)
CALCIUM SERPL-MCNC: 9.5 MG/DL (ref 8.6–10.2)
CHLORIDE BLD-SCNC: 100 MMOL/L (ref 98–107)
CO2: 23 MMOL/L (ref 22–29)
CREAT SERPL-MCNC: 1 MG/DL (ref 0.5–1)
FOLATE: 15 NG/ML (ref 4.8–24.2)
GFR AFRICAN AMERICAN: >60
GFR NON-AFRICAN AMERICAN: 52 ML/MIN/1.73
GLUCOSE BLD-MCNC: 75 MG/DL (ref 74–99)
POTASSIUM SERPL-SCNC: 5.9 MMOL/L (ref 3.5–5)
SODIUM BLD-SCNC: 139 MMOL/L (ref 132–146)
TOTAL PROTEIN: 7.4 G/DL (ref 6.4–8.3)
TSH SERPL DL<=0.05 MIU/L-ACNC: 4.39 UIU/ML (ref 0.27–4.2)
VITAMIN B-12: 424 PG/ML (ref 211–946)

## 2019-07-26 PROCEDURE — 1123F ACP DISCUSS/DSCN MKR DOCD: CPT | Performed by: FAMILY MEDICINE

## 2019-07-26 PROCEDURE — 82746 ASSAY OF FOLIC ACID SERUM: CPT

## 2019-07-26 PROCEDURE — 1036F TOBACCO NON-USER: CPT | Performed by: FAMILY MEDICINE

## 2019-07-26 PROCEDURE — G8427 DOCREV CUR MEDS BY ELIG CLIN: HCPCS | Performed by: FAMILY MEDICINE

## 2019-07-26 PROCEDURE — 1090F PRES/ABSN URINE INCON ASSESS: CPT | Performed by: FAMILY MEDICINE

## 2019-07-26 PROCEDURE — 99214 OFFICE O/P EST MOD 30 MIN: CPT | Performed by: FAMILY MEDICINE

## 2019-07-26 PROCEDURE — 82607 VITAMIN B-12: CPT

## 2019-07-26 PROCEDURE — 36415 COLL VENOUS BLD VENIPUNCTURE: CPT | Performed by: FAMILY MEDICINE

## 2019-07-26 PROCEDURE — 80053 COMPREHEN METABOLIC PANEL: CPT

## 2019-07-26 PROCEDURE — 4040F PNEUMOC VAC/ADMIN/RCVD: CPT | Performed by: FAMILY MEDICINE

## 2019-07-26 PROCEDURE — G8420 CALC BMI NORM PARAMETERS: HCPCS | Performed by: FAMILY MEDICINE

## 2019-07-26 PROCEDURE — 84443 ASSAY THYROID STIM HORMONE: CPT

## 2019-07-26 ASSESSMENT — PATIENT HEALTH QUESTIONNAIRE - PHQ9
SUM OF ALL RESPONSES TO PHQ QUESTIONS 1-9: 1
2. FEELING DOWN, DEPRESSED OR HOPELESS: 1
SUM OF ALL RESPONSES TO PHQ QUESTIONS 1-9: 1
SUM OF ALL RESPONSES TO PHQ9 QUESTIONS 1 & 2: 1
1. LITTLE INTEREST OR PLEASURE IN DOING THINGS: 0

## 2019-07-26 ASSESSMENT — ENCOUNTER SYMPTOMS
WHEEZING: 0
APNEA: 0
EYE PAIN: 0
NAUSEA: 0
EYE REDNESS: 0
CONSTIPATION: 0
EYE ITCHING: 0
SHORTNESS OF BREATH: 0
ABDOMINAL PAIN: 0
RHINORRHEA: 0
SORE THROAT: 0
BACK PAIN: 0
DIARRHEA: 0
COUGH: 0
CHEST TIGHTNESS: 0
BLOOD IN STOOL: 0
VOMITING: 0
COLOR CHANGE: 0
SINUS PRESSURE: 0

## 2019-07-26 NOTE — PROGRESS NOTES
status: Never Smoker    Smokeless tobacco: Never Used   Substance and Sexual Activity    Alcohol use: No    Drug use: No    Sexual activity: Never   Lifestyle    Physical activity:     Days per week: None     Minutes per session: None    Stress: None   Relationships    Social connections:     Talks on phone: None     Gets together: None     Attends Islam service: None     Active member of club or organization: None     Attends meetings of clubs or organizations: None     Relationship status: None    Intimate partner violence:     Fear of current or ex partner: None     Emotionally abused: None     Physically abused: None     Forced sexual activity: None   Other Topics Concern    None   Social History Narrative    None       Family History   Problem Relation Age of Onset    Cancer Brother     Cancer Other           Review of Systems   Constitutional: Positive for fatigue and malaise/fatigue. Negative for activity change, appetite change, chills, diaphoresis and fever. HENT: Negative for congestion, ear pain, hearing loss, nosebleeds, rhinorrhea, sinus pressure and sore throat. Eyes: Negative for pain, redness, itching and visual disturbance. Respiratory: Negative for apnea, cough, chest tightness, shortness of breath and wheezing. Cardiovascular: Negative for chest pain, palpitations, leg swelling and PND. Gastrointestinal: Negative for abdominal pain, blood in stool, constipation, diarrhea, nausea and vomiting. Endocrine: Negative. Genitourinary: Negative for decreased urine volume, difficulty urinating, dysuria, frequency, hematuria and urgency. Musculoskeletal: Negative for arthralgias, back pain, gait problem, joint swelling, myalgias and neck pain. Skin: Negative for color change and rash. Allergic/Immunologic: Negative for environmental allergies and food allergies. Neurological: Positive for dizziness and weakness. Negative for light-headedness, numbness and headaches.

## 2019-08-09 ENCOUNTER — HOSPITAL ENCOUNTER (OUTPATIENT)
Dept: AUDIOLOGY | Age: 84
Discharge: HOME | End: 2019-08-09
Payer: MEDICARE

## 2019-08-09 PROCEDURE — 92557 COMPREHENSIVE HEARING TEST: CPT | Performed by: AUDIOLOGIST

## 2019-08-09 PROCEDURE — 92567 TYMPANOMETRY: CPT | Performed by: AUDIOLOGIST

## 2019-08-12 DIAGNOSIS — F41.9 ANXIETY: ICD-10-CM

## 2019-08-12 RX ORDER — LORAZEPAM 1 MG/1
TABLET ORAL
Qty: 30 TABLET | Refills: 0 | Status: SHIPPED | OUTPATIENT
Start: 2019-08-12 | End: 2019-09-12

## 2019-08-15 ENCOUNTER — TELEPHONE (OUTPATIENT)
Dept: PRIMARY CARE CLINIC | Age: 84
End: 2019-08-15

## 2019-09-16 DIAGNOSIS — F41.9 ANXIETY: Primary | ICD-10-CM

## 2019-09-17 RX ORDER — LORAZEPAM 1 MG/1
TABLET ORAL
Qty: 30 TABLET | Refills: 0 | Status: SHIPPED | OUTPATIENT
Start: 2019-09-17 | End: 2019-10-08 | Stop reason: SDUPTHER

## 2019-10-08 DIAGNOSIS — F41.9 ANXIETY: ICD-10-CM

## 2019-10-08 RX ORDER — LORAZEPAM 1 MG/1
TABLET ORAL
Qty: 30 TABLET | Refills: 0 | Status: SHIPPED | OUTPATIENT
Start: 2019-10-08 | End: 2019-10-28 | Stop reason: SDUPTHER

## 2019-10-11 DIAGNOSIS — F41.9 ANXIETY: ICD-10-CM

## 2019-10-11 RX ORDER — LORAZEPAM 1 MG/1
TABLET ORAL
Qty: 30 TABLET | Refills: 0 | OUTPATIENT
Start: 2019-10-11 | End: 2019-11-11

## 2019-10-28 ENCOUNTER — OFFICE VISIT (OUTPATIENT)
Dept: PRIMARY CARE CLINIC | Age: 84
End: 2019-10-28
Payer: MEDICARE

## 2019-10-28 VITALS
HEART RATE: 87 BPM | BODY MASS INDEX: 25.32 KG/M2 | DIASTOLIC BLOOD PRESSURE: 88 MMHG | SYSTOLIC BLOOD PRESSURE: 130 MMHG | TEMPERATURE: 97.7 F | WEIGHT: 129 LBS | OXYGEN SATURATION: 90 % | HEIGHT: 60 IN

## 2019-10-28 DIAGNOSIS — I48.0 PAROXYSMAL ATRIAL FIBRILLATION (HCC): ICD-10-CM

## 2019-10-28 DIAGNOSIS — R53.83 FATIGUE, UNSPECIFIED TYPE: Primary | ICD-10-CM

## 2019-10-28 DIAGNOSIS — I10 ESSENTIAL HYPERTENSION, BENIGN: Chronic | ICD-10-CM

## 2019-10-28 DIAGNOSIS — F41.9 ANXIETY: ICD-10-CM

## 2019-10-28 DIAGNOSIS — F03.90 DEMENTIA WITHOUT BEHAVIORAL DISTURBANCE, UNSPECIFIED DEMENTIA TYPE: Chronic | ICD-10-CM

## 2019-10-28 PROCEDURE — G8484 FLU IMMUNIZE NO ADMIN: HCPCS | Performed by: FAMILY MEDICINE

## 2019-10-28 PROCEDURE — G8427 DOCREV CUR MEDS BY ELIG CLIN: HCPCS | Performed by: FAMILY MEDICINE

## 2019-10-28 PROCEDURE — 1090F PRES/ABSN URINE INCON ASSESS: CPT | Performed by: FAMILY MEDICINE

## 2019-10-28 PROCEDURE — 1123F ACP DISCUSS/DSCN MKR DOCD: CPT | Performed by: FAMILY MEDICINE

## 2019-10-28 PROCEDURE — 4040F PNEUMOC VAC/ADMIN/RCVD: CPT | Performed by: FAMILY MEDICINE

## 2019-10-28 PROCEDURE — 99214 OFFICE O/P EST MOD 30 MIN: CPT | Performed by: FAMILY MEDICINE

## 2019-10-28 PROCEDURE — 1036F TOBACCO NON-USER: CPT | Performed by: FAMILY MEDICINE

## 2019-10-28 PROCEDURE — G8417 CALC BMI ABV UP PARAM F/U: HCPCS | Performed by: FAMILY MEDICINE

## 2019-10-28 RX ORDER — LORAZEPAM 1 MG/1
0.5 TABLET ORAL EVERY 6 HOURS PRN
Qty: 120 TABLET | Refills: 2 | Status: SHIPPED | OUTPATIENT
Start: 2019-10-28 | End: 2019-11-27

## 2019-10-28 RX ORDER — CARVEDILOL 3.12 MG/1
3.12 TABLET ORAL 2 TIMES DAILY WITH MEALS
COMMUNITY
End: 2020-09-03 | Stop reason: SDUPTHER

## 2019-10-28 ASSESSMENT — ENCOUNTER SYMPTOMS
APNEA: 0
COUGH: 0
BLOOD IN STOOL: 0
CONSTIPATION: 0
VOMITING: 0
SORE THROAT: 0
BACK PAIN: 0
DIFFICULTY BREATHING: 1
DIARRHEA: 0
ABDOMINAL PAIN: 0
CHEST TIGHTNESS: 0
EYE ITCHING: 0
EYE REDNESS: 0
WHEEZING: 0
SHORTNESS OF BREATH: 0
NAUSEA: 0
EYE PAIN: 0
COLOR CHANGE: 0
SINUS PRESSURE: 0
RHINORRHEA: 0

## 2019-10-29 ENCOUNTER — HOSPITAL ENCOUNTER (OUTPATIENT)
Dept: AUDIOLOGY | Age: 84
Discharge: HOME | End: 2019-10-29
Payer: MEDICARE

## 2019-10-29 PROCEDURE — 9990000010 HC NO CHARGE VISIT: Performed by: AUDIOLOGIST

## 2019-10-31 ENCOUNTER — TELEPHONE (OUTPATIENT)
Dept: PRIMARY CARE CLINIC | Age: 84
End: 2019-10-31

## 2019-11-15 ENCOUNTER — TELEPHONE (OUTPATIENT)
Dept: PRIMARY CARE CLINIC | Age: 84
End: 2019-11-15

## 2019-11-21 ENCOUNTER — TELEPHONE (OUTPATIENT)
Dept: PRIMARY CARE CLINIC | Age: 84
End: 2019-11-21

## 2019-11-22 ENCOUNTER — HOSPITAL ENCOUNTER (OUTPATIENT)
Dept: AUDIOLOGY | Age: 84
Discharge: HOME | End: 2019-11-22
Payer: MEDICARE

## 2019-11-22 ENCOUNTER — HOSPITAL ENCOUNTER (EMERGENCY)
Age: 84
Discharge: HOME | End: 2019-11-22
Attending: EMERGENCY MEDICINE
Payer: MEDICARE

## 2019-11-22 ENCOUNTER — APPOINTMENT (OUTPATIENT)
Dept: GENERAL RADIOLOGY | Age: 84
End: 2019-11-22
Payer: MEDICARE

## 2019-11-22 VITALS
RESPIRATION RATE: 16 BRPM | SYSTOLIC BLOOD PRESSURE: 165 MMHG | TEMPERATURE: 97.2 F | DIASTOLIC BLOOD PRESSURE: 80 MMHG | HEART RATE: 85 BPM | OXYGEN SATURATION: 94 %

## 2019-11-22 DIAGNOSIS — F41.9 ANXIETY: Primary | ICD-10-CM

## 2019-11-22 DIAGNOSIS — S22.41XA CLOSED FRACTURE OF MULTIPLE RIBS OF RIGHT SIDE, INITIAL ENCOUNTER: Primary | ICD-10-CM

## 2019-11-22 PROCEDURE — 71101 X-RAY EXAM UNILAT RIBS/CHEST: CPT

## 2019-11-22 PROCEDURE — 99283 EMERGENCY DEPT VISIT LOW MDM: CPT

## 2019-11-22 PROCEDURE — 6370000000 HC RX 637 (ALT 250 FOR IP): Performed by: STUDENT IN AN ORGANIZED HEALTH CARE EDUCATION/TRAINING PROGRAM

## 2019-11-22 PROCEDURE — V5160 DISPENSING FEE BINAURAL: HCPCS | Performed by: AUDIOLOGIST

## 2019-11-22 PROCEDURE — V5261 HEARING AID, DIGIT, BIN, BTE: HCPCS | Performed by: AUDIOLOGIST

## 2019-11-22 RX ORDER — LORAZEPAM 0.5 MG/1
TABLET ORAL
Qty: 7 TABLET | Refills: 0 | Status: SHIPPED | OUTPATIENT
Start: 2019-11-22 | End: 2019-11-29

## 2019-11-22 RX ORDER — ACETAMINOPHEN 325 MG/1
650 TABLET ORAL ONCE
Status: COMPLETED | OUTPATIENT
Start: 2019-11-22 | End: 2019-11-22

## 2019-11-22 RX ADMIN — ACETAMINOPHEN 650 MG: 325 TABLET ORAL at 22:11

## 2019-11-22 ASSESSMENT — PAIN SCALES - GENERAL
PAINLEVEL_OUTOF10: 6
PAINLEVEL_OUTOF10: 7

## 2019-11-22 ASSESSMENT — PAIN DESCRIPTION - DESCRIPTORS: DESCRIPTORS: ACHING

## 2019-11-22 ASSESSMENT — PAIN - FUNCTIONAL ASSESSMENT: PAIN_FUNCTIONAL_ASSESSMENT: PREVENTS OR INTERFERES SOME ACTIVE ACTIVITIES AND ADLS

## 2019-11-22 ASSESSMENT — PAIN DESCRIPTION - PAIN TYPE: TYPE: ACUTE PAIN

## 2019-11-22 ASSESSMENT — PAIN DESCRIPTION - LOCATION: LOCATION: RIB CAGE

## 2019-11-22 ASSESSMENT — PAIN DESCRIPTION - ORIENTATION: ORIENTATION: RIGHT

## 2019-11-26 ENCOUNTER — OFFICE VISIT (OUTPATIENT)
Dept: PRIMARY CARE CLINIC | Age: 84
End: 2019-11-26
Payer: MEDICARE

## 2019-11-26 VITALS
SYSTOLIC BLOOD PRESSURE: 114 MMHG | BODY MASS INDEX: 25.32 KG/M2 | HEART RATE: 75 BPM | RESPIRATION RATE: 20 BRPM | WEIGHT: 129 LBS | HEIGHT: 60 IN | DIASTOLIC BLOOD PRESSURE: 62 MMHG

## 2019-11-26 DIAGNOSIS — S22.41XA CLOSED FRACTURE OF MULTIPLE RIBS OF RIGHT SIDE, INITIAL ENCOUNTER: Primary | ICD-10-CM

## 2019-11-26 PROCEDURE — 1090F PRES/ABSN URINE INCON ASSESS: CPT | Performed by: FAMILY MEDICINE

## 2019-11-26 PROCEDURE — 4040F PNEUMOC VAC/ADMIN/RCVD: CPT | Performed by: FAMILY MEDICINE

## 2019-11-26 PROCEDURE — 99213 OFFICE O/P EST LOW 20 MIN: CPT | Performed by: FAMILY MEDICINE

## 2019-11-26 PROCEDURE — G8427 DOCREV CUR MEDS BY ELIG CLIN: HCPCS | Performed by: FAMILY MEDICINE

## 2019-11-26 PROCEDURE — 1123F ACP DISCUSS/DSCN MKR DOCD: CPT | Performed by: FAMILY MEDICINE

## 2019-11-26 PROCEDURE — 1036F TOBACCO NON-USER: CPT | Performed by: FAMILY MEDICINE

## 2019-11-26 PROCEDURE — G8484 FLU IMMUNIZE NO ADMIN: HCPCS | Performed by: FAMILY MEDICINE

## 2019-11-26 PROCEDURE — G8417 CALC BMI ABV UP PARAM F/U: HCPCS | Performed by: FAMILY MEDICINE

## 2019-11-26 ASSESSMENT — ENCOUNTER SYMPTOMS
NAUSEA: 0
APNEA: 0
SPUTUM PRODUCTION: 0
EYE ITCHING: 0
RHINORRHEA: 0
SHORTNESS OF BREATH: 0
CONSTIPATION: 0
SINUS PRESSURE: 0
SORE THROAT: 0
BACK PAIN: 0
CHEST TIGHTNESS: 0
COUGH: 0
EYE REDNESS: 0
VOMITING: 0
WHEEZING: 0
BLOOD IN STOOL: 0
COLOR CHANGE: 0
ABDOMINAL PAIN: 0
DIARRHEA: 0
EYE PAIN: 0

## 2019-12-26 DIAGNOSIS — F41.9 ANXIETY: ICD-10-CM

## 2019-12-26 RX ORDER — LORAZEPAM 1 MG/1
TABLET ORAL
Qty: 30 TABLET | Refills: 0 | OUTPATIENT
Start: 2019-12-26

## 2019-12-28 RX ORDER — LORAZEPAM 1 MG/1
TABLET ORAL
Qty: 30 TABLET | Refills: 0 | Status: SHIPPED | OUTPATIENT
Start: 2019-12-28 | End: 2020-01-30 | Stop reason: SDUPTHER

## 2020-01-20 ENCOUNTER — CARE COORDINATION (OUTPATIENT)
Dept: CARE COORDINATION | Age: 85
End: 2020-01-20

## 2020-01-28 ENCOUNTER — OFFICE VISIT (OUTPATIENT)
Dept: PRIMARY CARE CLINIC | Age: 85
End: 2020-01-28
Payer: MEDICARE

## 2020-01-28 VITALS
BODY MASS INDEX: 25.48 KG/M2 | SYSTOLIC BLOOD PRESSURE: 132 MMHG | HEIGHT: 60 IN | TEMPERATURE: 98.2 F | WEIGHT: 129.8 LBS | HEART RATE: 72 BPM | DIASTOLIC BLOOD PRESSURE: 68 MMHG

## 2020-01-28 PROCEDURE — G8484 FLU IMMUNIZE NO ADMIN: HCPCS | Performed by: FAMILY MEDICINE

## 2020-01-28 PROCEDURE — G0439 PPPS, SUBSEQ VISIT: HCPCS | Performed by: FAMILY MEDICINE

## 2020-01-28 PROCEDURE — 1123F ACP DISCUSS/DSCN MKR DOCD: CPT | Performed by: FAMILY MEDICINE

## 2020-01-28 PROCEDURE — 4040F PNEUMOC VAC/ADMIN/RCVD: CPT | Performed by: FAMILY MEDICINE

## 2020-01-28 ASSESSMENT — PATIENT HEALTH QUESTIONNAIRE - PHQ9
SUM OF ALL RESPONSES TO PHQ QUESTIONS 1-9: 2
SUM OF ALL RESPONSES TO PHQ QUESTIONS 1-9: 2

## 2020-01-28 ASSESSMENT — LIFESTYLE VARIABLES: HOW OFTEN DO YOU HAVE A DRINK CONTAINING ALCOHOL: 0

## 2020-01-28 NOTE — PATIENT INSTRUCTIONS
Personalized Preventive Plan for Jaye Oneal - 1/28/2020  Medicare offers a range of preventive health benefits. Some of the tests and screenings are paid in full while other may be subject to a deductible, co-insurance, and/or copay. Some of these benefits include a comprehensive review of your medical history including lifestyle, illnesses that may run in your family, and various assessments and screenings as appropriate. After reviewing your medical record and screening and assessments performed today your provider may have ordered immunizations, labs, imaging, and/or referrals for you. A list of these orders (if applicable) as well as your Preventive Care list are included within your After Visit Summary for your review. Other Preventive Recommendations:    · A preventive eye exam performed by an eye specialist is recommended every 1-2 years to screen for glaucoma; cataracts, macular degeneration, and other eye disorders. · A preventive dental visit is recommended every 6 months. · Try to get at least 150 minutes of exercise per week or 10,000 steps per day on a pedometer . · Order or download the FREE \"Exercise & Physical Activity: Your Everyday Guide\" from [employer]. Call 6-510.447.2818 or search [employer] online. · You need 1253-6064 mg of calcium and 5935-4098 IU of vitamin D per day. It is possible to meet your calcium requirement with diet alone, but a vitamin D supplement is usually necessary to meet this goal.  · When exposed to the sun, use a sunscreen that protects against both UVA and UVB radiation with an SPF of 30 or greater. Reapply every 2 to 3 hours or after sweating, drying off with a towel, or swimming. · Always wear a seat belt when traveling in a car. Always wear a helmet when riding a bicycle or motorcycle.

## 2020-01-30 RX ORDER — LORAZEPAM 1 MG/1
TABLET ORAL
Qty: 30 TABLET | Refills: 2 | Status: SHIPPED
Start: 2020-01-30 | End: 2020-02-14 | Stop reason: SDUPTHER

## 2020-02-12 LAB
TSH SERPL DL<=0.05 MIU/L-ACNC: 4.13 UIU/ML
VITAMIN B-12: 417
VITAMIN D 25-HYDROXY: 16
VITAMIN D2, 25 HYDROXY: NORMAL
VITAMIN D3,25 HYDROXY: NORMAL

## 2020-02-14 RX ORDER — LORAZEPAM 1 MG/1
1 TABLET ORAL NIGHTLY
Qty: 30 TABLET | Refills: 2 | Status: SHIPPED | OUTPATIENT
Start: 2020-02-14 | End: 2020-03-15

## 2020-05-06 RX ORDER — LORAZEPAM 1 MG/1
TABLET ORAL
Qty: 30 TABLET | Refills: 0 | Status: SHIPPED
Start: 2020-05-06 | End: 2020-05-08

## 2020-05-08 RX ORDER — LORAZEPAM 1 MG/1
TABLET ORAL
Qty: 30 TABLET | Refills: 0 | Status: SHIPPED | OUTPATIENT
Start: 2020-05-08 | End: 2020-06-08

## 2020-05-13 LAB
TSH SERPL DL<=0.05 MIU/L-ACNC: 3.08 UIU/ML
VITAMIN B-12: 509
VITAMIN D 25-HYDROXY: 15
VITAMIN D2, 25 HYDROXY: NORMAL
VITAMIN D3,25 HYDROXY: NORMAL

## 2020-07-21 ENCOUNTER — TELEPHONE (OUTPATIENT)
Dept: PRIMARY CARE CLINIC | Age: 85
End: 2020-07-21

## 2020-07-21 NOTE — TELEPHONE ENCOUNTER
The pt wants to leave [address] to go live with her daughter but she will need a hospital bed .  The nursing home is calling to see if you will place an order for one and fax it over to them at [phone], they are saying that the pt needs one because of her copd

## 2020-08-19 LAB
VITAMIN D 25-HYDROXY: 13.37
VITAMIN D2, 25 HYDROXY: NORMAL
VITAMIN D3,25 HYDROXY: NORMAL

## 2020-08-27 ENCOUNTER — TELEPHONE (OUTPATIENT)
Dept: PRIMARY CARE CLINIC | Age: 85
End: 2020-08-27

## 2020-08-27 NOTE — TELEPHONE ENCOUNTER
Received call from Debra Simmons at [facility].  Pt is requesting to be d/c home on 8/31 to go home with her daughter. They will need an order stating that it is ok to d/c. Pt is also requesting a bed. They will need an order for a semi-electric bed. Order will need to say \"Patient needs head of bed to be elevated to 30 degrees. Patient need to make frequent body movements and changes to alleviate pain. \"    Order will need faxed to [phone] Attn: Debra Simmons

## 2020-08-28 NOTE — TELEPHONE ENCOUNTER
Debra Simmons calling back an order will not be accepted for the semi electric bed stating it needs to be listed in an office note. If you need to see patient they can sent up a virtual appt.

## 2020-08-31 ENCOUNTER — VIRTUAL VISIT (OUTPATIENT)
Dept: PRIMARY CARE CLINIC | Age: 85
End: 2020-08-31
Payer: MEDICARE

## 2020-08-31 PROBLEM — F32.5 MAJOR DEPRESSIVE DISORDER, SINGLE EPISODE, IN FULL REMISSION (HCC): Status: ACTIVE | Noted: 2020-08-31

## 2020-08-31 PROBLEM — N30.00 ACUTE CYSTITIS WITHOUT HEMATURIA: Status: RESOLVED | Noted: 2018-12-21 | Resolved: 2020-08-31

## 2020-08-31 PROCEDURE — 1036F TOBACCO NON-USER: CPT | Performed by: FAMILY MEDICINE

## 2020-08-31 PROCEDURE — 1123F ACP DISCUSS/DSCN MKR DOCD: CPT | Performed by: FAMILY MEDICINE

## 2020-08-31 PROCEDURE — 1090F PRES/ABSN URINE INCON ASSESS: CPT | Performed by: FAMILY MEDICINE

## 2020-08-31 PROCEDURE — G8417 CALC BMI ABV UP PARAM F/U: HCPCS | Performed by: FAMILY MEDICINE

## 2020-08-31 PROCEDURE — G8427 DOCREV CUR MEDS BY ELIG CLIN: HCPCS | Performed by: FAMILY MEDICINE

## 2020-08-31 PROCEDURE — 4040F PNEUMOC VAC/ADMIN/RCVD: CPT | Performed by: FAMILY MEDICINE

## 2020-08-31 PROCEDURE — 99214 OFFICE O/P EST MOD 30 MIN: CPT | Performed by: FAMILY MEDICINE

## 2020-08-31 ASSESSMENT — ENCOUNTER SYMPTOMS
EYE PAIN: 0
RHINORRHEA: 0
EYE ITCHING: 0
VOMITING: 0
BACK PAIN: 0
COUGH: 0
SORE THROAT: 0
NAUSEA: 0
CHEST TIGHTNESS: 0
WHEEZING: 0
SINUS PRESSURE: 0
CONSTIPATION: 0
DIARRHEA: 0
SHORTNESS OF BREATH: 0
APNEA: 0
ABDOMINAL PAIN: 0
BLOOD IN STOOL: 0
COLOR CHANGE: 0
EYE REDNESS: 0

## 2020-08-31 NOTE — PROGRESS NOTES
TeleMedicine Video Visit    This visit was performed as a virtual video visit using a synchronous, two-way, audio-video telehealth technology platform. Patient identification was verified at the start of the visit, including the patient's telephone number and physical location. I discussed with the patient the nature of our telehealth visits, that:     1. Due to the nature of an audio- video modality, the only components of a physical exam that could be done are the elements supported by direct observation. 2. I would evaluate the patient and recommend diagnostics and treatments based on my assessment. 3. If it was felt that the patient should be evaluated in clinic or an emergency room setting, then they would be directed there. 4. Our sessions are not being recorded and that personal health information is protected. 5. Our team would provide follow up care in person if/when the patient needs it. Patient does agree to proceed with telemedicine consultation. Patient's location: home address in [facility]  Physician  location other address in [facility] other people involved in call (NurseSylvia)      Time spent: Greater than 25    This visit was completed virtually using Doxy. me        Chief Complaint:     Chief Complaint   Patient presents with    Other     visit for hospital bed     [facility] is stable. Will be leaving her facility to live with her daughter Julio Echavarria. Needs a hospital bed at home    Other   This is a recurrent (needs hospital bed) problem. The problem occurs intermittently. The problem has been waxing and waning. Associated symptoms include fatigue and weakness. Pertinent negatives include no abdominal pain, arthralgias, chest pain, chills, congestion, coughing, diaphoresis, fever, headaches, joint swelling, myalgias, nausea, neck pain, numbness, rash, sore throat or vomiting. Associated symptoms comments: shakiness. Nothing aggravates the symptoms. She has tried nothing for the symptoms.  The treatment provided no relief. Hypertension   This is a chronic problem. The current episode started more than 1 year ago. The problem has been waxing and waning since onset. Associated symptoms include malaise/fatigue. Pertinent negatives include no chest pain, headaches, neck pain, palpitations, PND, shortness of breath or sweats. There are no associated agents to hypertension. There are no known risk factors for coronary artery disease. The current treatment provides significant improvement. There are no compliance problems. There is no history of CAD/MI, CVA or PVD. Identifiable causes of hypertension include a thyroid problem. There is no history of a hypertension causing med, pheochromocytoma, renovascular disease or sleep apnea. Fatigue   This is a chronic problem. The current episode started more than 1 month ago. The problem occurs intermittently. The problem has been waxing and waning. Associated symptoms include fatigue and weakness. Pertinent negatives include no abdominal pain, arthralgias, chest pain, chills, congestion, coughing, diaphoresis, fever, headaches, joint swelling, myalgias, nausea, neck pain, numbness, rash, sore throat or vomiting. Nothing aggravates the symptoms. She has tried nothing for the symptoms. The treatment provided no relief.        Patient Active Problem List   Diagnosis    Essential hypertension, benign    Dementia ([address]    Atrial fibrillation (HCC)    Depression    GERD (gastroesophageal reflux disease)    Valvular heart disease    Cardiac pacemaker in situ    CKD (chronic kidney disease) stage 3, GFR 30-59 ml/min ([facility])    Orthostatic hypotension    Gait difficulty    Seborrheic keratoses    AV block    Left upper arm pain    Fatigue    Benign essential tremor    Peripheral neuropathy    Major depressive disorder, single episode, in full remission ([address]       Past Medical History:   Diagnosis Date    Acid reflux disease     Atrial fibrillation ([address]     h/o    Physically abused: None     Forced sexual activity: None   Other Topics Concern    None   Social History Narrative    None       Family History   Problem Relation Age of Onset    Cancer Brother     Cancer Other           Review of Systems   Constitutional: Positive for fatigue and malaise/fatigue. Negative for activity change, appetite change, chills, diaphoresis and fever. HENT: Negative for congestion, ear pain, hearing loss, nosebleeds, rhinorrhea, sinus pressure and sore throat. Eyes: Negative for pain, redness, itching and visual disturbance. Respiratory: Negative for apnea, cough, chest tightness, shortness of breath and wheezing. Cardiovascular: Negative for chest pain, palpitations, leg swelling and PND. Gastrointestinal: Negative for abdominal pain, blood in stool, constipation, diarrhea, nausea and vomiting. Endocrine: Negative. Genitourinary: Negative for decreased urine volume, difficulty urinating, dysuria, frequency, hematuria and urgency. Musculoskeletal: Negative for arthralgias, back pain, gait problem, joint swelling, myalgias and neck pain. Skin: Negative for color change and rash. Allergic/Immunologic: Negative for environmental allergies and food allergies. Neurological: Positive for weakness. Negative for dizziness, light-headedness, numbness and headaches. Hematological: Negative for adenopathy. Does not bruise/bleed easily. Psychiatric/Behavioral: Negative for behavioral problems, dysphoric mood and sleep disturbance. The patient is not nervous/anxious and is not hyperactive. All other systems reviewed and are negative. Patient-Reported Vitals 8/31/2020   Patient-Reported Weight 132lb   Patient-Reported Height 5      There were no vitals taken for this visit. Physical Exam  Constitutional:       General: She is not in acute distress. Appearance: She is not toxic-appearing.    Pulmonary:      Effort: Pulmonary effort is normal. No respiratory distress. Neurological:      Mental Status: She is alert. Mental status is at baseline. Psychiatric:         Attention and Perception: Attention normal.         Mood and Affect: Mood normal.         Speech: Speech is delayed. Cognition and Memory: Memory is impaired. ASSESSMENT/PLAN:    Patient Active Problem List   Diagnosis    Essential hypertension, benign    Dementia ([address]    Atrial fibrillation (HCC)    Depression    GERD (gastroesophageal reflux disease)    Valvular heart disease    Cardiac pacemaker in situ    CKD (chronic kidney disease) stage 3, GFR 30-59 ml/min (HCC)    Orthostatic hypotension    Gait difficulty    Seborrheic keratoses    AV block    Left upper arm pain    Fatigue    Benign essential tremor    Peripheral neuropathy    Major depressive disorder, single episode, in full remission ([address]       Yudi White was seen today for other. Diagnoses and all orders for this visit:    Dementia without behavioral disturbance, unspecified dementia type (HCC)    Major depressive disorder, single episode, in full remission ([address]    Paroxysmal atrial fibrillation (HCC)    Gait difficulty    Fatigue, unspecified type    Gastroesophageal reflux disease without esophagitis    Valvular heart disease    Hospital bed ordered for patient to have at home. Patient needs head of bed to be elevated to 30 degrees. Patient needs to make frequent body movements and changes to alleviate pain. Return in about 3 months (around 11/30/2020) for [address] labs. I spent 25 min with this patient. I spent greater than 50% of the time counseling this patient.         Saman Wall DO  8/31/2020  8:57 AM

## 2020-09-01 ENCOUNTER — CARE COORDINATION (OUTPATIENT)
Dept: CASE MANAGEMENT | Age: 85
End: 2020-09-01

## 2020-09-01 NOTE — CARE COORDINATION
[address] [facility] Transitions Initial Follow Up Call    Call within 2 business days of discharge: Yes    Patient: Verneice Carrel Patient : 1925   MRN: <[id number]>  Reason for Admission:  Discharge Date: 19 RARS: No data recorded    Last Discharge [facility]       Complaint Diagnosis Description Type Department Provider    19 Rib Pain Closed fracture of multiple ribs of right side, initial encounter ED (DISCHARGE) [facility],       No utilization . Open in error. Spoke with:[facility]    Facility: [facility]     Non-face-to-face services provided:      Care Transitions 24 Hour Call    Care Transitions Interventions         Follow Up  No future appointments.     Marcelino Matute RN

## 2020-09-03 ENCOUNTER — OFFICE VISIT (OUTPATIENT)
Dept: PRIMARY CARE CLINIC | Age: 85
End: 2020-09-03
Payer: MEDICARE

## 2020-09-03 VITALS
HEART RATE: 78 BPM | HEIGHT: 60 IN | WEIGHT: 133.6 LBS | BODY MASS INDEX: 26.23 KG/M2 | TEMPERATURE: 97.7 F | SYSTOLIC BLOOD PRESSURE: 120 MMHG | OXYGEN SATURATION: 92 % | RESPIRATION RATE: 16 BRPM | DIASTOLIC BLOOD PRESSURE: 70 MMHG

## 2020-09-03 PROCEDURE — G8427 DOCREV CUR MEDS BY ELIG CLIN: HCPCS | Performed by: FAMILY MEDICINE

## 2020-09-03 PROCEDURE — 1036F TOBACCO NON-USER: CPT | Performed by: FAMILY MEDICINE

## 2020-09-03 PROCEDURE — G8417 CALC BMI ABV UP PARAM F/U: HCPCS | Performed by: FAMILY MEDICINE

## 2020-09-03 PROCEDURE — 1123F ACP DISCUSS/DSCN MKR DOCD: CPT | Performed by: FAMILY MEDICINE

## 2020-09-03 PROCEDURE — 1090F PRES/ABSN URINE INCON ASSESS: CPT | Performed by: FAMILY MEDICINE

## 2020-09-03 PROCEDURE — 99214 OFFICE O/P EST MOD 30 MIN: CPT | Performed by: FAMILY MEDICINE

## 2020-09-03 PROCEDURE — 4040F PNEUMOC VAC/ADMIN/RCVD: CPT | Performed by: FAMILY MEDICINE

## 2020-09-03 RX ORDER — GABAPENTIN 100 MG/1
100 CAPSULE ORAL EVERY MORNING
Qty: 90 CAPSULE | Refills: 2 | Status: SHIPPED
Start: 2020-09-03 | End: 2021-05-10

## 2020-09-03 RX ORDER — MEMANTINE HYDROCHLORIDE 28 MG/1
28 CAPSULE, EXTENDED RELEASE ORAL DAILY
Qty: 30 CAPSULE | Refills: 5 | Status: SHIPPED
Start: 2020-09-03 | End: 2021-03-08

## 2020-09-03 RX ORDER — LORAZEPAM 1 MG/1
1 TABLET ORAL EVERY 8 HOURS PRN
Qty: 90 TABLET | Refills: 2 | Status: SHIPPED
Start: 2020-09-03 | End: 2021-01-11

## 2020-09-03 RX ORDER — MIRTAZAPINE 30 MG/1
30 TABLET, FILM COATED ORAL NIGHTLY
COMMUNITY
End: 2020-09-03 | Stop reason: SDUPTHER

## 2020-09-03 RX ORDER — POTASSIUM CHLORIDE 20 MEQ/1
20 TABLET, EXTENDED RELEASE ORAL 2 TIMES DAILY WITH MEALS
Qty: 60 TABLET | Refills: 3 | Status: SHIPPED
Start: 2020-09-03 | End: 2021-06-07

## 2020-09-03 RX ORDER — MULTIVIT-MIN/IRON/FOLIC ACID/K 18-600-40
2000 CAPSULE ORAL 2 TIMES DAILY
Qty: 60 CAPSULE | Refills: 5 | Status: SHIPPED
Start: 2020-09-03 | End: 2021-03-08

## 2020-09-03 RX ORDER — LORATADINE 10 MG/1
10 TABLET ORAL EVERY MORNING
Qty: 30 TABLET | Refills: 5 | Status: SHIPPED
Start: 2020-09-03 | End: 2021-03-08

## 2020-09-03 RX ORDER — ACETAMINOPHEN 325 MG/1
650 TABLET ORAL EVERY 6 HOURS PRN
Qty: 120 TABLET | Refills: 3 | Status: SHIPPED | OUTPATIENT
Start: 2020-09-03

## 2020-09-03 RX ORDER — LORAZEPAM 1 MG/1
1 TABLET ORAL EVERY 6 HOURS PRN
COMMUNITY
End: 2020-09-03 | Stop reason: SDUPTHER

## 2020-09-03 RX ORDER — VITS A,C,E/LUTEIN/MINERALS 300MCG-200
1 TABLET ORAL DAILY
Qty: 30 TABLET | Refills: 5 | Status: SHIPPED | OUTPATIENT
Start: 2020-09-03

## 2020-09-03 RX ORDER — MULTIVIT-MIN/IRON/FOLIC ACID/K 18-600-40
2000 CAPSULE ORAL 2 TIMES DAILY
COMMUNITY
End: 2020-09-03 | Stop reason: SDUPTHER

## 2020-09-03 RX ORDER — ASPIRIN 81 MG/1
81 TABLET ORAL DAILY
Qty: 90 TABLET | Refills: 1 | Status: SHIPPED | OUTPATIENT
Start: 2020-09-03

## 2020-09-03 RX ORDER — MIRTAZAPINE 30 MG/1
30 TABLET, FILM COATED ORAL NIGHTLY
Qty: 30 TABLET | Refills: 5 | Status: SHIPPED
Start: 2020-09-03 | End: 2021-03-08

## 2020-09-03 RX ORDER — PANTOPRAZOLE SODIUM 40 MG/1
40 TABLET, DELAYED RELEASE ORAL EVERY MORNING
Qty: 30 TABLET | Refills: 5 | Status: SHIPPED
Start: 2020-09-03 | Stop reason: SDUPTHER

## 2020-09-03 RX ORDER — CARVEDILOL 3.12 MG/1
3.12 TABLET ORAL 2 TIMES DAILY WITH MEALS
Qty: 60 TABLET | Refills: 5 | Status: SHIPPED
Start: 2020-09-03 | End: 2020-10-28 | Stop reason: SINTOL

## 2020-09-03 ASSESSMENT — ENCOUNTER SYMPTOMS
SORE THROAT: 0
EYE REDNESS: 0
CONSTIPATION: 0
SINUS PRESSURE: 0
SHORTNESS OF BREATH: 0
WHEEZING: 0
COLOR CHANGE: 0
EYE ITCHING: 0
NAUSEA: 0
VOMITING: 0
EYE PAIN: 0
BACK PAIN: 0
COUGH: 0
RHINORRHEA: 0
BLOOD IN STOOL: 0
ABDOMINAL PAIN: 0
DIARRHEA: 0
CHEST TIGHTNESS: 0
APNEA: 0

## 2020-09-03 NOTE — PROGRESS NOTES
Chief Complaint:     Chief Complaint   Patient presents with    Discuss Medications         Other   This is a recurrent (review meds from discharge) problem. The problem occurs intermittently. The problem has been waxing and waning. Associated symptoms include fatigue and weakness. Pertinent negatives include no abdominal pain, arthralgias, chest pain, chills, congestion, coughing, diaphoresis, fever, headaches, joint swelling, myalgias, nausea, neck pain, numbness, rash, sore throat or vomiting. Associated symptoms comments: shakiness. Nothing aggravates the symptoms. She has tried nothing for the symptoms. The treatment provided no relief. Hypertension   This is a chronic problem. The current episode started more than 1 year ago. The problem has been waxing and waning since onset. Associated symptoms include malaise/fatigue. Pertinent negatives include no chest pain, headaches, neck pain, palpitations, PND, shortness of breath or sweats. There are no associated agents to hypertension. There are no known risk factors for coronary artery disease. The current treatment provides significant improvement. There are no compliance problems. There is no history of CAD/MI, CVA or PVD. Identifiable causes of hypertension include a thyroid problem. There is no history of a hypertension causing med, pheochromocytoma, renovascular disease or sleep apnea. Fatigue   This is a chronic problem. The current episode started more than 1 month ago. The problem occurs intermittently. The problem has been waxing and waning. Associated symptoms include fatigue and weakness. Pertinent negatives include no abdominal pain, arthralgias, chest pain, chills, congestion, coughing, diaphoresis, fever, headaches, joint swelling, myalgias, nausea, neck pain, numbness, rash, sore throat or vomiting. Nothing aggravates the symptoms. She has tried nothing for the symptoms. The treatment provided no relief.        Patient Active Problem List Diagnosis    Essential hypertension, benign    Dementia ([address]    Atrial fibrillation (HCC)    Depression    GERD (gastroesophageal reflux disease)    Valvular heart disease    Cardiac pacemaker in situ    CKD (chronic kidney disease) stage 3, GFR 30-59 ml/min (HCC)    Orthostatic hypotension    Gait difficulty    Seborrheic keratoses    AV block    Left upper arm pain    Fatigue    Benign essential tremor    Peripheral neuropathy    Major depressive disorder, single episode, in full remission ([address]       Past Medical History:   Diagnosis Date    Acid reflux disease     Atrial fibrillation (HCC)     h/o    AVB (atrioventricular block)     COPD (chronic obstructive pulmonary disease) ([address]     Dementia ([address]     Depression     Hypotension     [facility]tracerebral hematoma ([facility] 2009    d/t fall    Nausea & vomiting     Seizure disorder ([address] 2009    related to intracranial bleed    Syncope and collapse 5/25/2014       Past Surgical History:   Procedure Laterality Date    BRAIN SURGERY  2009    icb    BREAST SURGERY      cysts    CARDIAC CATHETERIZATION  2001 or 2012    CARDIOVERSION  08/09/2018    CATARACT REMOVAL WITH IMPLANT Bilateral 10 yrs ago    CHOLECYSTECTOMY      ERCP  09/30/2014    With stent placement    ERCP  11/14/14    PACEMAKER INSERTION Left 6-    Dr. Salazar Lights       Current Outpatient Medications   Medication Sig Dispense Refill    memantine ER (NAMENDA XR) 28 MG CP24 extended release capsule Take 1 capsule by mouth daily 30 capsule 5    Multiple Vitamins-Minerals (OCUVITE-LUTEIN) TABS oral tablet Take 1 tablet by mouth daily 30 tablet 5    carvedilol (COREG) 3.125 MG tablet Take 1 tablet by mouth 2 times daily (with meals) 60 tablet 5    gabapentin (NEURONTIN) 100 MG capsule Take 1 capsule by mouth every morning for 90 days.  90 capsule 2    loratadine (CLARITIN) 10 MG tablet Take 1 tablet by mouth every morning 30 tablet 5    LORazepam Spouse name: None    Number of children: None    Years of education: None    Highest education level: None   Occupational History    Occupation: retired     Employer: RETIRED   Social Needs    Financial resource strain: None    Food insecurity     Worry: None     Inability: None    Transportation needs     Medical: None     Non-medical: None   Tobacco Use    Smoking status: Never Smoker    Smokeless tobacco: Never Used   Substance and Sexual Activity    Alcohol use: No    Drug use: No    Sexual activity: Never   Lifestyle    Physical activity     Days per week: None     Minutes per session: None    Stress: None   Relationships    Social connections     Talks on phone: None     Gets together: None     Attends Jain service: None     Active member of club or organization: None     Attends meetings of clubs or organizations: None     Relationship status: None    Intimate partner violence     Fear of current or ex partner: None     Emotionally abused: None     Physically abused: None     Forced sexual activity: None   Other Topics Concern    None   Social History Narrative    None       Family History   Problem Relation Age of Onset    Cancer Brother     Cancer Other           Review of Systems   Constitutional: Positive for fatigue and malaise/fatigue. Negative for activity change, appetite change, chills, diaphoresis and fever. HENT: Negative for congestion, ear pain, hearing loss, nosebleeds, rhinorrhea, sinus pressure and sore throat. Eyes: Negative for pain, redness, itching and visual disturbance. Respiratory: Negative for apnea, cough, chest tightness, shortness of breath and wheezing. Cardiovascular: Negative for chest pain, palpitations, leg swelling and PND. Gastrointestinal: Negative for abdominal pain, blood in stool, constipation, diarrhea, nausea and vomiting. Endocrine: Negative.     Genitourinary: Negative for decreased urine volume, difficulty urinating, dysuria, frequency, hematuria and urgency. Musculoskeletal: Negative for arthralgias, back pain, gait problem, joint swelling, myalgias and neck pain. Skin: Negative for color change and rash. Allergic/Immunologic: Negative for environmental allergies and food allergies. Neurological: Positive for weakness. Negative for dizziness, light-headedness, numbness and headaches. Hematological: Negative for adenopathy. Does not bruise/bleed easily. Psychiatric/Behavioral: Negative for behavioral problems, dysphoric mood and sleep disturbance. The patient is not nervous/anxious and is not hyperactive. All other systems reviewed and are negative. /70   Pulse 78   Temp 97.7 °F (36.5 °C)   Resp 16   Ht 5' (1.524 m)   Wt 133 lb 9.6 oz (60.6 kg)   SpO2 92%   BMI 26.09 kg/m²     Physical Exam  Vitals signs and nursing note reviewed. Constitutional:       General: She is not in acute distress. Appearance: Normal appearance. She is well-developed. HENT:      Head: Normocephalic and atraumatic. Right Ear: Hearing, tympanic membrane and external ear normal. No tenderness. No middle ear effusion. Left Ear: Hearing, tympanic membrane and external ear normal. No tenderness. No middle ear effusion. Nose: Nose normal. No congestion or rhinorrhea. Right Turbinates: Not enlarged. Left Turbinates: Not enlarged. Mouth/Throat:      Mouth: Mucous membranes are moist.      Tongue: No lesions. Pharynx: Oropharynx is clear. No oropharyngeal exudate or posterior oropharyngeal erythema. Eyes:      General: No scleral icterus. Conjunctiva/sclera: Conjunctivae normal.      Pupils: Pupils are equal, round, and reactive to light. Neck:      Musculoskeletal: Normal range of motion and neck supple. No neck rigidity or muscular tenderness. Thyroid: No thyromegaly. Cardiovascular:      Rate and Rhythm: Normal rate and regular rhythm. Heart sounds: Normal heart sounds. No murmur. Pulmonary:      Effort: Pulmonary effort is normal. No respiratory distress. Breath sounds: Normal breath sounds. No wheezing or rales. Abdominal:      General: Bowel sounds are normal. There is no distension. Palpations: Abdomen is soft. Tenderness: There is no abdominal tenderness. Musculoskeletal: Normal range of motion. General: No tenderness. Lymphadenopathy:      Cervical: No cervical adenopathy. Skin:     General: Skin is warm and dry. Findings: No erythema or rash. Neurological:      General: No focal deficit present. Mental Status: She is alert and oriented to person, place, and time. Cranial Nerves: No cranial nerve deficit. Deep Tendon Reflexes: Reflexes are normal and symmetric. Reflexes normal.   Psychiatric:         Mood and Affect: Mood normal.                                 ASSESSMENT/PLAN:    Patient Active Problem List   Diagnosis    Essential hypertension, benign    Dementia ([facility]    Atrial fibrillation (HCC)    Depression    GERD (gastroesophageal reflux disease)    Valvular heart disease    Cardiac pacemaker in situ    CKD (chronic kidney disease) stage 3, GFR 30-59 ml/min ([facility])    Orthostatic hypotension    Gait difficulty    Seborrheic keratoses    AV block    Left upper arm pain    Fatigue    Benign essential tremor    Peripheral neuropathy    Major depressive disorder, single episode, in full remission ([facility]       Faiza Ray was seen today for discuss medications. Diagnoses and all orders for this visit:    Anxiety  -     LORazepam (ATIVAN) 1 MG tablet; Take 1 tablet by mouth every 8 hours as needed for Anxiety for up to 90 days.     Dementia without behavioral disturbance, unspecified dementia type (HCC)    Major depressive disorder, single episode, in full remission (HCC)    Paroxysmal atrial fibrillation (HCC)    Gait difficulty    CKD (chronic kidney disease) stage 3, GFR 30-59 ml/min ([facility])    Other orders  -     memantine ER (NAMENDA XR) 28 MG CP24 extended release capsule; Take 1 capsule by mouth daily  -     Multiple Vitamins-Minerals (OCUVITE-LUTEIN) TABS oral tablet; Take 1 tablet by mouth daily  -     carvedilol (COREG) 3.125 MG tablet; Take 1 tablet by mouth 2 times daily (with meals)  -     gabapentin (NEURONTIN) 100 MG capsule; Take 1 capsule by mouth every morning for 90 days. -     loratadine (CLARITIN) 10 MG tablet; Take 1 tablet by mouth every morning  -     mirtazapine (REMERON) 30 MG tablet; Take 1 tablet by mouth nightly  -     pantoprazole (PROTONIX) 40 MG tablet; Take 1 tablet by mouth every morning  -     potassium chloride (KLOR-CON M) 20 MEQ extended release tablet; Take 1 tablet by mouth 2 times daily (with meals)  -     Cholecalciferol (VITAMIN D) 50 MCG (2000 UT) CAPS capsule; Take 2,000 Units by mouth 2 times daily  -     acetaminophen (TYLENOL) 325 MG tablet; Take 2 tablets by mouth every 6 hours as needed for Pain  -     aspirin EC 81 MG EC tablet; Take 1 tablet by mouth daily  -     Elastic Bandages & Supports (MEDICAL COMPRESSION STOCKINGS) MISC; 1 each by Does not apply route daily 20-30 mmHg. Knee high. Dx Venous insufficiency          Return in about 3 months (around 12/3/2020) for [address] labs. I spent 30 minutes with this patient. I spent greater than 50% of the time counseling this patient.         Alisia Jean DO  9/3/2020  10:53 AM

## 2020-09-10 RX ORDER — ALBUTEROL SULFATE 90 UG/1
2 AEROSOL, METERED RESPIRATORY (INHALATION) 4 TIMES DAILY
Qty: 1 INHALER | Refills: 3 | Status: SHIPPED
Start: 2020-09-10 | End: 2020-11-27

## 2020-09-21 ENCOUNTER — TELEPHONE (OUTPATIENT)
Dept: PRIMARY CARE CLINIC | Age: 85
End: 2020-09-21

## 2020-09-21 NOTE — TELEPHONE ENCOUNTER
Elizabeth from [facility] home LM asking for H&P for Moisés Zapata to be faxed over. Fax not going through called and LM to see if there is a different fax number I can send it or if i wrote it down the wrong.

## 2020-10-27 ENCOUNTER — TELEPHONE (OUTPATIENT)
Dept: PRIMARY CARE CLINIC | Age: 85
End: 2020-10-27

## 2020-10-27 ENCOUNTER — HOSPITAL ENCOUNTER (EMERGENCY)
Age: 85
Discharge: HOME | End: 2020-10-27
Attending: EMERGENCY MEDICINE
Payer: MEDICARE

## 2020-10-27 ENCOUNTER — APPOINTMENT (OUTPATIENT)
Dept: CT IMAGING | Age: 85
End: 2020-10-27
Payer: MEDICARE

## 2020-10-27 ENCOUNTER — APPOINTMENT (OUTPATIENT)
Dept: GENERAL RADIOLOGY | Age: 85
End: 2020-10-27
Payer: MEDICARE

## 2020-10-27 VITALS
WEIGHT: 130 LBS | DIASTOLIC BLOOD PRESSURE: 95 MMHG | RESPIRATION RATE: 18 BRPM | SYSTOLIC BLOOD PRESSURE: 160 MMHG | HEART RATE: 75 BPM | BODY MASS INDEX: 25.52 KG/M2 | TEMPERATURE: 97 F | OXYGEN SATURATION: 94 % | HEIGHT: 60 IN

## 2020-10-27 PROBLEM — I50.9 CONGESTIVE HEART FAILURE (HCC): Status: ACTIVE | Noted: 2020-10-27

## 2020-10-27 PROBLEM — T14.8XXA ABRASION: Status: ACTIVE | Noted: 2020-10-27

## 2020-10-27 PROBLEM — S00.83XA CONTUSION OF FACE: Status: ACTIVE | Noted: 2020-10-27

## 2020-10-27 PROBLEM — S09.90XA HEAD INJURY: Status: ACTIVE | Noted: 2020-10-27

## 2020-10-27 LAB
ALBUMIN SERPL-MCNC: 3.8 G/DL (ref 3.5–5.2)
ALP BLD-CCNC: 110 U/L (ref 35–104)
ALT SERPL-CCNC: 8 U/L (ref 0–32)
AMYLASE: 118 U/L (ref 20–100)
ANION GAP SERPL CALCULATED.3IONS-SCNC: 9 MMOL/L (ref 7–16)
AST SERPL-CCNC: 13 U/L (ref 0–31)
BILIRUB SERPL-MCNC: 0.9 MG/DL (ref 0–1.2)
BILIRUBIN DIRECT: <0.2 MG/DL (ref 0–0.3)
BILIRUBIN, INDIRECT: ABNORMAL MG/DL (ref 0–1)
BUN BLDV-MCNC: 21 MG/DL (ref 8–23)
CALCIUM SERPL-MCNC: 9.8 MG/DL (ref 8.6–10.2)
CHLORIDE BLD-SCNC: 104 MMOL/L (ref 98–107)
CK MB: 1.7 NG/ML (ref 0–4.3)
CO2: 27 MMOL/L (ref 22–29)
CREAT SERPL-MCNC: 1.2 MG/DL (ref 0.5–1)
EKG ATRIAL RATE: 83 BPM
EKG P-R INTERVAL: 150 MS
EKG Q-T INTERVAL: 380 MS
EKG QRS DURATION: 136 MS
EKG QTC CALCULATION (BAZETT): 446 MS
EKG R AXIS: -5 DEGREES
EKG T AXIS: 157 DEGREES
EKG VENTRICULAR RATE: 83 BPM
GFR AFRICAN AMERICAN: 50
GFR NON-AFRICAN AMERICAN: 42 ML/MIN/1.73
GLUCOSE BLD-MCNC: 116 MG/DL (ref 74–99)
HCT VFR BLD CALC: 38.2 % (ref 34–48)
HEMOGLOBIN: 12.2 G/DL (ref 11.5–15.5)
INR BLD: 1
LIPASE: 50 U/L (ref 13–60)
MAGNESIUM: 1.7 MG/DL (ref 1.6–2.6)
MCH RBC QN AUTO: 32 PG (ref 26–35)
MCHC RBC AUTO-ENTMCNC: 31.9 % (ref 32–34.5)
MCV RBC AUTO: 100.3 FL (ref 80–99.9)
PDW BLD-RTO: 14.5 FL (ref 11.5–15)
PLATELET # BLD: 154 E9/L (ref 130–450)
PMV BLD AUTO: 9.9 FL (ref 7–12)
POTASSIUM SERPL-SCNC: 4.6 MMOL/L (ref 3.5–5)
PRO-BNP: 949 PG/ML (ref 0–450)
PROTHROMBIN TIME: 11.4 SEC (ref 9.3–12.4)
RBC # BLD: 3.81 E12/L (ref 3.5–5.5)
SODIUM BLD-SCNC: 140 MMOL/L (ref 132–146)
TOTAL CK: 36 U/L (ref 20–180)
TOTAL PROTEIN: 6.8 G/DL (ref 6.4–8.3)
TROPONIN: <0.01 NG/ML (ref 0–0.03)
WBC # BLD: 8.6 E9/L (ref 4.5–11.5)

## 2020-10-27 PROCEDURE — 83690 ASSAY OF LIPASE: CPT

## 2020-10-27 PROCEDURE — 84484 ASSAY OF TROPONIN QUANT: CPT

## 2020-10-27 PROCEDURE — 82150 ASSAY OF AMYLASE: CPT

## 2020-10-27 PROCEDURE — 71045 X-RAY EXAM CHEST 1 VIEW: CPT

## 2020-10-27 PROCEDURE — 2500000003 HC RX 250 WO HCPCS: Performed by: EMERGENCY MEDICINE

## 2020-10-27 PROCEDURE — 6360000002 HC RX W HCPCS: Performed by: EMERGENCY MEDICINE

## 2020-10-27 PROCEDURE — 82550 ASSAY OF CK (CPK): CPT

## 2020-10-27 PROCEDURE — 83880 ASSAY OF NATRIURETIC PEPTIDE: CPT

## 2020-10-27 PROCEDURE — 96375 TX/PRO/DX INJ NEW DRUG ADDON: CPT

## 2020-10-27 PROCEDURE — 70450 CT HEAD/BRAIN W/O DYE: CPT

## 2020-10-27 PROCEDURE — 6370000000 HC RX 637 (ALT 250 FOR IP): Performed by: EMERGENCY MEDICINE

## 2020-10-27 PROCEDURE — 93010 ELECTROCARDIOGRAM REPORT: CPT | Performed by: INTERNAL MEDICINE

## 2020-10-27 PROCEDURE — 70486 CT MAXILLOFACIAL W/O DYE: CPT

## 2020-10-27 PROCEDURE — 99283 EMERGENCY DEPT VISIT LOW MDM: CPT

## 2020-10-27 PROCEDURE — 90471 IMMUNIZATION ADMIN: CPT | Performed by: EMERGENCY MEDICINE

## 2020-10-27 PROCEDURE — 80076 HEPATIC FUNCTION PANEL: CPT

## 2020-10-27 PROCEDURE — 93005 ELECTROCARDIOGRAM TRACING: CPT | Performed by: EMERGENCY MEDICINE

## 2020-10-27 PROCEDURE — 96374 THER/PROPH/DIAG INJ IV PUSH: CPT

## 2020-10-27 PROCEDURE — 83735 ASSAY OF MAGNESIUM: CPT

## 2020-10-27 PROCEDURE — 85027 COMPLETE CBC AUTOMATED: CPT

## 2020-10-27 PROCEDURE — 82553 CREATINE MB FRACTION: CPT

## 2020-10-27 PROCEDURE — 72125 CT NECK SPINE W/O DYE: CPT

## 2020-10-27 PROCEDURE — 80048 BASIC METABOLIC PNL TOTAL CA: CPT

## 2020-10-27 PROCEDURE — 85610 PROTHROMBIN TIME: CPT

## 2020-10-27 PROCEDURE — 90715 TDAP VACCINE 7 YRS/> IM: CPT | Performed by: EMERGENCY MEDICINE

## 2020-10-27 RX ORDER — LABETALOL HYDROCHLORIDE 5 MG/ML
20 INJECTION, SOLUTION INTRAVENOUS ONCE
Status: COMPLETED | OUTPATIENT
Start: 2020-10-27 | End: 2020-10-27

## 2020-10-27 RX ORDER — DIAPER,BRIEF,INFANT-TODD,DISP
EACH MISCELLANEOUS ONCE
Status: COMPLETED | OUTPATIENT
Start: 2020-10-27 | End: 2020-10-27

## 2020-10-27 RX ORDER — FUROSEMIDE 10 MG/ML
20 INJECTION INTRAMUSCULAR; INTRAVENOUS ONCE
Status: COMPLETED | OUTPATIENT
Start: 2020-10-27 | End: 2020-10-27

## 2020-10-27 RX ADMIN — BACITRACIN ZINC: 500 OINTMENT TOPICAL at 01:54

## 2020-10-27 RX ADMIN — LABETALOL HYDROCHLORIDE 20 MG: 5 INJECTION, SOLUTION INTRAVENOUS at 04:05

## 2020-10-27 RX ADMIN — TETANUS TOXOID, REDUCED DIPHTHERIA TOXOID AND ACELLULAR PERTUSSIS VACCINE, ADSORBED 0.5 ML: 5; 2.5; 8; 8; 2.5 SUSPENSION INTRAMUSCULAR at 01:54

## 2020-10-27 RX ADMIN — FUROSEMIDE 20 MG: 10 INJECTION, SOLUTION INTRAMUSCULAR; INTRAVENOUS at 04:05

## 2020-10-27 ASSESSMENT — ENCOUNTER SYMPTOMS
SHORTNESS OF BREATH: 0
BACK PAIN: 0
COUGH: 0
ABDOMINAL PAIN: 0
NAUSEA: 0
VOMITING: 0
BLOOD IN STOOL: 0

## 2020-10-27 ASSESSMENT — PAIN SCALES - GENERAL: PAINLEVEL_OUTOF10: 6

## 2020-10-27 ASSESSMENT — PAIN DESCRIPTION - DESCRIPTORS: DESCRIPTORS: HEADACHE

## 2020-10-27 ASSESSMENT — PAIN DESCRIPTION - ORIENTATION: ORIENTATION: LEFT

## 2020-10-27 ASSESSMENT — PAIN DESCRIPTION - LOCATION: LOCATION: HEAD

## 2020-10-27 ASSESSMENT — PAIN DESCRIPTION - PAIN TYPE: TYPE: ACUTE PAIN

## 2020-10-27 NOTE — ED PROVIDER NOTES
MECHANICAL FALL TONIGHT NO LOC NO VOMITING NO CHEST PAIN NO SOB NO ABDOMINAL PAIN NO BACK PAIN FACIAL CONTUSION AND ABRASION TO LEFT SIDE OF FACE AND BROW. CHRONIC DIZZY    The history is provided by the patient. No  was used. Review of Systems   Constitutional: Negative for chills and fever. Respiratory: Negative for cough and shortness of breath. Cardiovascular: Negative for chest pain. Gastrointestinal: Negative for abdominal pain, blood in stool, nausea and vomiting. Genitourinary: Negative for dysuria and frequency. Musculoskeletal: Negative for back pain. Skin: Positive for wound. Negative for rash. Neurological: Negative for weakness and headaches. All other systems reviewed and are negative. Physical Exam  Vitals signs and nursing note reviewed. Constitutional:       Appearance: She is well-developed. HENT:      Head: Normocephalic and atraumatic. Eyes:      Pupils: Pupils are equal, round, and reactive to light. Neck:      Musculoskeletal: Normal range of motion and neck supple. Cardiovascular:      Rate and Rhythm: Normal rate and regular rhythm. Pulmonary:      Effort: Pulmonary effort is normal. No respiratory distress. Breath sounds: Normal breath sounds. No wheezing or rales. Abdominal:      General: Bowel sounds are normal.      Palpations: Abdomen is soft. Tenderness: There is no abdominal tenderness. There is no guarding or rebound. Musculoskeletal:         General: Swelling and tenderness (LEFT LOWER ORBIT EOMI) present. Skin:     General: Skin is warm and dry. Findings: Bruising present. Comments: SKIN TEAR ABRASION TO LEFT BROW BRUISING TO LEFT CHEEK. EOMI NO HYPHEMA   Neurological:      Mental Status: She is alert and oriented to person, place, and time. Cranial Nerves: No cranial nerve deficit.       Coordination: Coordination normal.          Procedures     MDM --------------------------------------------- PAST HISTORY ---------------------------------------------  Past Medical History:  has a past medical history of Acid reflux disease, Atrial fibrillation (HCC), AVB (atrioventricular block), COPD (chronic obstructive pulmonary disease) ([facility] Dementia ([facility] [address] [facility] Depression, Hypotension, [facility] Nausea & vomiting, Seizure disorder ([facility] [address] [facility] and Syncope and collapse. Past Surgical History:  has a past surgical history that includes Cholecystectomy; Cardiac catheterization (2001 or 2012); Pacemaker insertion (Left, 6-); brain surgery (2009); Breast surgery; Cataract removal with implant (Bilateral, 10 yrs ago); ERCP (09/30/2014); ERCP (11/14/14); and Cardioversion (08/09/2018). Social History:  reports that she has never smoked. She has never used smokeless tobacco. She reports that she does not drink alcohol or use drugs. Family History: family history includes Cancer in her brother and another family member. The patients home medications have been reviewed. Allergies: Patient has no known allergies.     -------------------------------------------------- RESULTS -------------------------------------------------    LABS:  Results for orders placed or performed during the hospital encounter of 22/66/55   Basic metabolic panel   Result Value Ref Range    Sodium 140 132 - 146 mmol/L    Potassium 4.6 3.5 - 5.0 mmol/L    Chloride 104 98 - 107 mmol/L    CO2 27 22 - 29 mmol/L    Anion Gap 9 7 - 16 mmol/L    Glucose 116 (H) 74 - 99 mg/dL    BUN 21 8 - 23 mg/dL    CREATININE 1.2 (H) 0.5 - 1.0 mg/dL    GFR Non-African American 42 >=60 mL/min/1.73    GFR African American 50     Calcium 9.8 8.6 - 10.2 mg/dL   CBC   Result Value Ref Range    WBC 8.6 4.5 - 11.5 E9/L    RBC 3.81 3.50 - 5.50 E12/L    Hemoglobin 12.2 11.5 - 15.5 g/dL    Hematocrit 38.2 34.0 - 48.0 %    .3 (H) 80.0 - 99.9 fL    MCH 32.0 26.0 - 35.0 pg    MCHC 31.9 (L) 32.0 - 34.5 %    RDW 14.5 11.5 - 15.0 fL    Platelets 096 068 - 628 E9/L    MPV 9.9 7.0 - 12.0 fL   Troponin   Result Value Ref Range    Troponin <0.01 0.00 - 0.03 ng/mL   CK MB   Result Value Ref Range    CK-MB 1.7 0.0 - 4.3 ng/mL   CK   Result Value Ref Range    Total CK 36 20 - 180 U/L   Hepatic Function Panel   Result Value Ref Range    Total Protein 6.8 6.4 - 8.3 g/dL    Alb 3.8 3.5 - 5.2 g/dL    Alkaline Phosphatase 110 (H) 35 - 104 U/L    ALT 8 0 - 32 U/L    AST 13 0 - 31 U/L    Total Bilirubin 0.9 0.0 - 1.2 mg/dL    Bilirubin, Direct <0.2 0.0 - 0.3 mg/dL    Bilirubin, Indirect see below 0.0 - 1.0 mg/dL   Lipase   Result Value Ref Range    Lipase 50 13 - 60 U/L   Amylase   Result Value Ref Range    Amylase 118 (H) 20 - 100 U/L   Magnesium   Result Value Ref Range    Magnesium 1.7 1.6 - 2.6 mg/dL   Brain Natriuretic Peptide   Result Value Ref Range    Pro- (H) 0 - 450 pg/mL   Protime-INR   Result Value Ref Range    Protime 11.4 9.3 - 12.4 sec    INR 1.0    EKG 12 Lead   Result Value Ref Range    Ventricular Rate 83 BPM    Atrial Rate 83 BPM    P-R Interval 150 ms    QRS Duration 136 ms    Q-T Interval 380 ms    QTc Calculation (Bazett) 446 ms    R Axis -5 degrees    T Axis 157 degrees       RADIOLOGY:  CT HEAD WO CONTRAST   Final Result   1. No acute intracranial abnormality. 2. Left periorbital contusion and hematoma with no underlying fracture. CT CERVICAL SPINE WO CONTRAST   Final Result   1. Severe extension of the neck is felt to be positional.   2. No fracture or subluxation throughout the cervical spine. 3. Incidental thyroid nodule on the right. Recommendations are provided   below. RECOMMENDATIONS:   1.4 cm incidental thyroid nodule. No follow-up imaging is recommended. Reference: J Am Consuelo Radiol. 2015 Feb;12(2): 143-50         CT FACIAL BONES WO CONTRAST   Final Result   1. Left periorbital contusion and hematoma. 2. No definite fracture.   Slight inward bowing of the lateral wall of the   left orbit appears symmetric to the contralateral right orbit and is   relatively stable from remote imaging. Orbital wall fracture is considered   less likely. XR CHEST PORTABLE   Final Result   Spectrum of findings suspected to represent asymmetric pulmonary edema. Developing pneumonitis in the right lower lung zone is considered less likely. EKG:  This EKG is signed and interpreted by me. Rate: 83  Rhythm: PACER  Interpretation: no acute changes  Comparison: stable as compared to patient's most recent EKG      ------------------------- NURSING NOTES AND VITALS REVIEWED ---------------------------   The nursing notes within the ED encounter and vital signs as below have been reviewed. BP (!) 160/95   Pulse 75   Temp 97 °F (36.1 °C)   Resp 18   Ht 5' (1.524 m)   Wt 130 lb (59 kg)   SpO2 94%   BMI 25.39 kg/m²   Oxygen Saturation Interpretation: Normal      ------------------------------------------ PROGRESS NOTES ------------------------------------------     Consultations:      Counseling:   I have spoken with the patient and discussed todays results, in addition to providing specific details for the plan of care and counseling regarding the diagnosis and prognosis. Their questions are answered at this time and they are agreeable with the plan.      --------------------------------- ADDITIONAL PROVIDER NOTES ---------------------------------         This patient's ED course included: a personal history and physicial examination, re-evaluation prior to disposition, multiple bedside re-evaluations, IV medications, cardiac monitoring, continuous pulse oximetry and complex medical decision making and emergency management    This patient has remained hemodynamically stable during their ED course.     Critical Care:     PATIENT  REFUSES ADMISSION LIVES WITH DAUGHTER TOLD NEEDS CLOSE FOLLOW UP HTN AND CHF     --------------------------------- IMPRESSION AND DISPOSITION ---------------------------------    IMPRESSION  1. Essential hypertension    2. Congestive heart failure, unspecified HF chronicity, unspecified [facility] failure type ([facility]    3. Contusion of face, initial encounter    4. Injury of head, initial encounter    5.  Abrasion        DISPOSITION  Disposition: Discharge to home  Patient condition is stable              Dilma Hernandez MD  10/27/20 [id number] Roque Bennett MD  10/28/20 1805

## 2020-10-27 NOTE — ED NOTES
Bed: HD  Expected date:   Expected time:   Means of arrival:   Comments:  ems     Aishwarya Michel RN  10/27/20 [id number]

## 2020-10-28 ENCOUNTER — VIRTUAL VISIT (OUTPATIENT)
Dept: PRIMARY CARE CLINIC | Age: 85
End: 2020-10-28
Payer: MEDICARE

## 2020-10-28 PROBLEM — S09.90XA HEAD INJURY: Status: RESOLVED | Noted: 2020-10-27 | Resolved: 2020-10-28

## 2020-10-28 PROBLEM — M79.622 LEFT UPPER ARM PAIN: Status: RESOLVED | Noted: 2017-05-11 | Resolved: 2020-10-28

## 2020-10-28 PROBLEM — S00.83XA CONTUSION OF FACE: Status: RESOLVED | Noted: 2020-10-27 | Resolved: 2020-10-28

## 2020-10-28 PROBLEM — T14.8XXA ABRASION: Status: RESOLVED | Noted: 2020-10-27 | Resolved: 2020-10-28

## 2020-10-28 PROCEDURE — G8484 FLU IMMUNIZE NO ADMIN: HCPCS | Performed by: FAMILY MEDICINE

## 2020-10-28 PROCEDURE — 4040F PNEUMOC VAC/ADMIN/RCVD: CPT | Performed by: FAMILY MEDICINE

## 2020-10-28 PROCEDURE — G8417 CALC BMI ABV UP PARAM F/U: HCPCS | Performed by: FAMILY MEDICINE

## 2020-10-28 PROCEDURE — 1036F TOBACCO NON-USER: CPT | Performed by: FAMILY MEDICINE

## 2020-10-28 PROCEDURE — 99214 OFFICE O/P EST MOD 30 MIN: CPT | Performed by: FAMILY MEDICINE

## 2020-10-28 PROCEDURE — G8427 DOCREV CUR MEDS BY ELIG CLIN: HCPCS | Performed by: FAMILY MEDICINE

## 2020-10-28 PROCEDURE — 1123F ACP DISCUSS/DSCN MKR DOCD: CPT | Performed by: FAMILY MEDICINE

## 2020-10-28 PROCEDURE — 1090F PRES/ABSN URINE INCON ASSESS: CPT | Performed by: FAMILY MEDICINE

## 2020-10-28 ASSESSMENT — ENCOUNTER SYMPTOMS
CONSTIPATION: 0
COLOR CHANGE: 0
APNEA: 0
RHINORRHEA: 0
EYE ITCHING: 0
SORE THROAT: 0
VOMITING: 0
SINUS PRESSURE: 0
EYE REDNESS: 0
SHORTNESS OF BREATH: 0
ABDOMINAL PAIN: 0
EYE PAIN: 0
CHEST TIGHTNESS: 0
COUGH: 0
BACK PAIN: 0
BLOOD IN STOOL: 0
NAUSEA: 0
DIARRHEA: 0
WHEEZING: 0

## 2020-10-28 NOTE — PROGRESS NOTES
TeleMedicine Video Visit    This visit was performed as a virtual video visit using a synchronous, two-way, audio-video telehealth technology platform. Patient identification was verified at the start of the visit, including the patient's telephone number and physical location. I discussed with the patient the nature of our telehealth visits, that:     1. Due to the nature of an audio- video modality, the only components of a physical exam that could be done are the elements supported by direct observation. 2. I would evaluate the patient and recommend diagnostics and treatments based on my assessment. 3. If it was felt that the patient should be evaluated in clinic or an emergency room setting, then they would be directed there. 4. Our sessions are not being recorded and that personal health information is protected. 5. Our team would provide follow up care in person if/when the patient needs it. Patient does agree to proceed with telemedicine consultation. Patient's location: home address in [facility]  Physician  location other address in [facility] other people involved in call (Daughter Albert Gill)      Time spent: Greater than 25    This visit was completed virtually using Doxy. me        Chief Complaint:     Chief Complaint   Patient presents with    Follow-up     fall 10/27/2020 12:30am    Follow-Up from [address] [place] [address]    Hypertension         Fall   Incident onset: 10/27/2020. The fall occurred while walking. She landed on hard floor. The point of impact was the head, face and neck. The pain is present in the head, face and neck. The pain is moderate. The symptoms are aggravated by movement. Pertinent negatives include no abdominal pain, fever, headaches, hematuria, nausea, numbness or vomiting. Treatments tried: evaluated in ED: labs, CT scans. Fatigue   This is a chronic problem. The current episode started more than 1 month ago. The problem occurs intermittently.  The problem has been waxing and waning. Associated symptoms include fatigue and weakness. Pertinent negatives include no abdominal pain, arthralgias, chest pain, chills, congestion, coughing, diaphoresis, fever, headaches, joint swelling, myalgias, nausea, neck pain, numbness, rash, sore throat or vomiting. Nothing aggravates the symptoms. She has tried nothing for the symptoms. The treatment provided no relief. Hypertension   This is a chronic problem. The current episode started more than 1 year ago. The problem has been waxing and waning since onset. Associated symptoms include malaise/fatigue. Pertinent negatives include no chest pain, headaches, neck pain, palpitations, PND, shortness of breath or sweats. There are no associated agents to hypertension. There are no known risk factors for coronary artery disease. The current treatment provides significant improvement. There are no compliance problems. There is no history of CAD/MI, CVA or PVD. Identifiable causes of hypertension include a thyroid problem. There is no history of a hypertension causing med, pheochromocytoma, renovascular disease or sleep apnea. Other   This is a recurrent (review meds from discharge) problem. The problem occurs intermittently. The problem has been waxing and waning. Associated symptoms include fatigue and weakness. Pertinent negatives include no abdominal pain, arthralgias, chest pain, chills, congestion, coughing, diaphoresis, fever, headaches, joint swelling, myalgias, nausea, neck pain, numbness, rash, sore throat or vomiting. Associated symptoms comments: shakiness. Nothing aggravates the symptoms. She has tried nothing for the symptoms. The treatment provided no relief.        Patient Active Problem List   Diagnosis    Essential hypertension    Dementia ([facility]    Atrial fibrillation (HCC)    Depression    GERD (gastroesophageal reflux disease)    Valvular heart disease    Cardiac pacemaker in situ    CKD (chronic kidney disease) stage 3, GFR 30-59 ml/min    Orthostatic hypotension    Gait difficulty    Seborrheic keratoses    AV block    Fatigue    Benign essential tremor    Peripheral neuropathy    Major depressive disorder, single episode, in full remission ([address]    Congestive heart failure ([address]       Past Medical History:   Diagnosis Date    Acid reflux disease     Atrial fibrillation (HCC)     h/o    AVB (atrioventricular block)     COPD (chronic obstructive pulmonary disease) ([facility]     Dementia ([facility]     Depression     Hypotension     Intracerebral hematoma ([facility] 2009    d/t fall    Nausea & vomiting     Seizure disorder ([facility] [facility] 2009    related to intracranial bleed    Syncope and collapse 5/25/2014       Past Surgical History:   Procedure Laterality Date    BRAIN SURGERY  2009    icb    BREAST SURGERY      cysts    CARDIAC CATHETERIZATION  2001 or 2012    CARDIOVERSION  08/09/2018    CATARACT REMOVAL WITH IMPLANT Bilateral 10 yrs ago    CHOLECYSTECTOMY      ERCP  09/30/2014    With stent placement    ERCP  11/14/14    PACEMAKER INSERTION Left 6-    Dr. Beth Reina       Current Outpatient Medications   Medication Sig Dispense Refill    albuterol sulfate  (90 Base) MCG/ACT inhaler Inhale 2 puffs into the lungs 4 times daily 1 Inhaler 3    memantine ER (NAMENDA XR) 28 MG CP24 extended release capsule Take 1 capsule by mouth daily 30 capsule 5    Multiple Vitamins-Minerals (OCUVITE-LUTEIN) TABS oral tablet Take 1 tablet by mouth daily 30 tablet 5    gabapentin (NEURONTIN) 100 MG capsule Take 1 capsule by mouth every morning for 90 days. 90 capsule 2    loratadine (CLARITIN) 10 MG tablet Take 1 tablet by mouth every morning 30 tablet 5    LORazepam (ATIVAN) 1 MG tablet Take 1 tablet by mouth every 8 hours as needed for Anxiety for up to 90 days.  90 tablet 2    mirtazapine (REMERON) 30 MG tablet Take 1 tablet by mouth nightly 30 tablet 5    pantoprazole (PROTONIX) 40 MG tablet Take 1 Smoking status: Never Smoker    Smokeless tobacco: Never Used   Substance and Sexual Activity    Alcohol use: No    Drug use: No    Sexual activity: Never   Lifestyle    Physical activity     Days per week: None     Minutes per session: None    Stress: None   Relationships    Social connections     Talks on phone: None     Gets together: None     Attends Hindu service: None     Active member of club or organization: None     Attends meetings of clubs or organizations: None     Relationship status: None    Intimate partner violence     Fear of current or ex partner: None     Emotionally abused: None     Physically abused: None     Forced sexual activity: None   Other Topics Concern    None   Social History Narrative    None       Family History   Problem Relation Age of Onset    Cancer Brother     Cancer Other           Review of Systems   Constitutional: Positive for fatigue and malaise/fatigue. Negative for activity change, appetite change, chills, diaphoresis and fever. HENT: Negative for congestion, ear pain, hearing loss, nosebleeds, rhinorrhea, sinus pressure and sore throat. Eyes: Negative for pain, redness, itching and visual disturbance. Respiratory: Negative for apnea, cough, chest tightness, shortness of breath and wheezing. Cardiovascular: Negative for chest pain, palpitations, leg swelling and PND. Gastrointestinal: Negative for abdominal pain, blood in stool, constipation, diarrhea, nausea and vomiting. Endocrine: Negative. Genitourinary: Negative for decreased urine volume, difficulty urinating, dysuria, frequency, hematuria and urgency. Musculoskeletal: Negative for arthralgias, back pain, gait problem, joint swelling, myalgias and neck pain. Skin: Negative for color change and rash. Allergic/Immunologic: Negative for environmental allergies and food allergies. Neurological: Positive for weakness.  Negative for dizziness, light-headedness, numbness and headaches. Hematological: Negative for adenopathy. Does not bruise/bleed easily. Psychiatric/Behavioral: Negative for behavioral problems, dysphoric mood and sleep disturbance. The patient is not nervous/anxious and is not hyperactive. All other systems reviewed and are negative. Patient-Reported Vitals 10/28/2020   Patient-Reported Weight -   Patient-Reported Height -   Patient-Reported Systolic 98   Patient-Reported Diastolic 56      There were no vitals taken for this visit. Physical Exam  Vitals signs and nursing note reviewed. Constitutional:       General: She is not in acute distress. Appearance: She is not toxic-appearing. Pulmonary:      Effort: Pulmonary effort is normal. No respiratory distress. ASSESSMENT/PLAN:    Patient Active Problem List   Diagnosis    Essential hypertension    Dementia ([address]    Atrial fibrillation (HCC)    Depression    GERD (gastroesophageal reflux disease)    Valvular heart disease    Cardiac pacemaker in situ    CKD (chronic kidney disease) stage 3, GFR 30-59 ml/min    Orthostatic hypotension    Gait difficulty    Seborrheic keratoses    AV block    Fatigue    Benign essential tremor    Peripheral neuropathy    Major depressive disorder, single episode, in full remission ([address]    Congestive heart failure ([address]       Olman Iqbal was seen today for follow-up, follow-up from hospital, fall, fatigue and hypertension. Diagnoses and all orders for this visit:    Essential hypertension    Paroxysmal atrial fibrillation (HCC)    Orthostatic hypotension    Gait difficulty    Fatigue, unspecified type    Fall in home, initial encounter      Discussed with her daughter. Will hold carvedilol for now. Reviewed ED records including labs and CT scans    Return if symptoms worsen or fail to improve. I spent 30 minutes with this patient. I spent greater than 50% of the time counseling this patient.         Patience Khan Cooper Beltran DO  10/28/2020  10:32 AM

## 2020-10-30 ENCOUNTER — TELEPHONE (OUTPATIENT)
Dept: PRIMARY CARE CLINIC | Age: 85
End: 2020-10-30

## 2020-10-30 NOTE — TELEPHONE ENCOUNTER
Mel Chapin from [facility] called to let you know she fell at home the other day and went to ER. No fractures or anything. Just wanted you to be aware.

## 2020-10-30 NOTE — TELEPHONE ENCOUNTER
Pt daughter Ambika Reason called and concerned about pt. She has been sleeping a lot more than normal and said she has probably only been up a total of 3 hours since Tuesday. Her BP was 113/54 yesterday and when she is up she's dizzy and has to sit down almost immediately. She is very bruised and sore especially in her back. Yesterday after eating half of a pb&j threw up uncontrollably, this was the only time she threw up but she's not drinking but and is worried she's dehydrated but when she was at the hospital they told her she wasn't. Not emptying bladder as she used to, used to go 5-6 times a night and not going during the night at all anymore. Only going about 3 times a day. She keeps telling her daughter she's very tired. She knows this has taken a large toll on Davis and just wants to make sure she's doing everything she can to help her. I offered to call  and have him call her for peace of mind but only wanted me to relay message to you.

## 2020-11-04 ENCOUNTER — TELEPHONE (OUTPATIENT)
Dept: PRIMARY CARE CLINIC | Age: 85
End: 2020-11-04

## 2020-11-04 RX ORDER — AMOXICILLIN 875 MG/1
875 TABLET, COATED ORAL 2 TIMES DAILY
Qty: 20 TABLET | Refills: 0 | Status: SHIPPED | OUTPATIENT
Start: 2020-11-04 | End: 2020-11-14

## 2020-11-04 NOTE — TELEPHONE ENCOUNTER
Patient has runny nose and congestion, coughing from drainage. Wanting to know your recommendations.    robert mason

## 2020-12-16 ENCOUNTER — TELEPHONE (OUTPATIENT)
Dept: PRIMARY CARE CLINIC | Age: 85
End: 2020-12-16

## 2020-12-16 RX ORDER — AMOXICILLIN 875 MG/1
875 TABLET, COATED ORAL 2 TIMES DAILY
Qty: 20 TABLET | Refills: 0 | Status: SHIPPED | OUTPATIENT
Start: 2020-12-16 | End: 2020-12-26

## 2020-12-16 NOTE — TELEPHONE ENCOUNTER
Pt daughter calling and states patient has sinus drainage, sneezing, and a cough. She is wondering if you will call in something for her to the pharmacy. She is unable to come to the office.

## 2021-01-09 DIAGNOSIS — F41.9 ANXIETY: ICD-10-CM

## 2021-01-11 RX ORDER — LORAZEPAM 1 MG/1
TABLET ORAL
Qty: 90 TABLET | Refills: 2 | Status: SHIPPED
Start: 2021-01-11 | End: 2021-04-09

## 2021-02-07 RX ORDER — PANTOPRAZOLE SODIUM 40 MG/1
40 TABLET, DELAYED RELEASE ORAL EVERY MORNING
Qty: 90 TABLET | Refills: 1 | Status: SHIPPED
Start: 2021-02-07 | End: 2021-08-04

## 2021-03-08 RX ORDER — MIRTAZAPINE 30 MG/1
30 TABLET, FILM COATED ORAL NIGHTLY
Qty: 30 TABLET | Refills: 5 | Status: SHIPPED | OUTPATIENT
Start: 2021-03-08

## 2021-03-08 RX ORDER — MEMANTINE HYDROCHLORIDE 28 MG/1
CAPSULE, EXTENDED RELEASE ORAL
Qty: 30 CAPSULE | Refills: 5 | Status: SHIPPED | OUTPATIENT
Start: 2021-03-08

## 2021-03-08 RX ORDER — LORATADINE 10 MG/1
10 TABLET ORAL EVERY MORNING
Qty: 30 TABLET | Refills: 5 | Status: SHIPPED | OUTPATIENT
Start: 2021-03-08

## 2021-03-08 RX ORDER — ACETAMINOPHEN 160 MG
TABLET,DISINTEGRATING ORAL
Qty: 60 CAPSULE | Refills: 5 | Status: SHIPPED | OUTPATIENT
Start: 2021-03-08

## 2021-04-09 DIAGNOSIS — F41.9 ANXIETY: ICD-10-CM

## 2021-04-09 RX ORDER — LORAZEPAM 1 MG/1
TABLET ORAL
Qty: 90 TABLET | Refills: 0 | Status: SHIPPED
Start: 2021-04-09 | End: 2021-05-10

## 2021-04-14 ENCOUNTER — TELEPHONE (OUTPATIENT)
Dept: PRIMARY CARE CLINIC | Age: 86
End: 2021-04-14

## 2021-04-14 DIAGNOSIS — I50.9 CONGESTIVE HEART FAILURE, UNSPECIFIED HF CHRONICITY, UNSPECIFIED HEART FAILURE TYPE (HCC): ICD-10-CM

## 2021-04-14 DIAGNOSIS — R54 SENILE DEBILITY: ICD-10-CM

## 2021-04-14 DIAGNOSIS — I38 VALVULAR HEART DISEASE: ICD-10-CM

## 2021-04-14 DIAGNOSIS — F03.90 DEMENTIA WITHOUT BEHAVIORAL DISTURBANCE, UNSPECIFIED DEMENTIA TYPE: ICD-10-CM

## 2021-04-14 DIAGNOSIS — R62.7 FAILURE TO THRIVE IN ADULT: ICD-10-CM

## 2021-04-14 NOTE — TELEPHONE ENCOUNTER
Pt's daughter is very concerned about her mothers mental state. She thinks that her dementia is progressing. Daughter would like to speak to you regarding this but she said that her mother will not come into the office at all. Daughter asking if she can set up an appt to talk to you or if you can call her directly.

## 2021-04-15 NOTE — TELEPHONE ENCOUNTER
Tessy Mulligan the daughter is going to call her mom to see if she wants to do a video apt, will call back.

## 2021-04-19 NOTE — TELEPHONE ENCOUNTER
Pt's daughter states that she is refusing to do a virtual visit. Daughter wants to know if she can be placed on Hospice that will come to the home. She said that her mothers mental status is declining very quickly.

## 2021-05-08 DIAGNOSIS — F41.9 ANXIETY: ICD-10-CM

## 2021-05-10 RX ORDER — LORAZEPAM 1 MG/1
TABLET ORAL
Qty: 90 TABLET | Refills: 1 | Status: ON HOLD
Start: 2021-05-10 | End: 2021-06-17 | Stop reason: HOSPADM

## 2021-05-10 RX ORDER — GABAPENTIN 100 MG/1
CAPSULE ORAL
Qty: 90 CAPSULE | Refills: 2 | Status: SHIPPED | OUTPATIENT
Start: 2021-05-10 | End: 2021-08-08

## 2021-06-07 RX ORDER — POTASSIUM CHLORIDE 20 MEQ/1
TABLET, EXTENDED RELEASE ORAL
Qty: 60 TABLET | Refills: 3 | Status: SHIPPED | OUTPATIENT
Start: 2021-06-07

## 2021-06-14 ENCOUNTER — HOSPITAL ENCOUNTER (INPATIENT)
Age: 86
LOS: 3 days | Discharge: NURSING, REHAB OR LONG-TERM CARE | DRG: 885 | End: 2021-06-17
Attending: EMERGENCY MEDICINE | Admitting: PSYCHIATRY & NEUROLOGY
Payer: MEDICARE

## 2021-06-14 DIAGNOSIS — N30.00 ACUTE CYSTITIS WITHOUT HEMATURIA: ICD-10-CM

## 2021-06-14 DIAGNOSIS — F32.A DEPRESSION, UNSPECIFIED DEPRESSION TYPE: Primary | ICD-10-CM

## 2021-06-14 PROBLEM — R45.89 SUICIDAL BEHAVIOR WITHOUT ATTEMPTED SELF-INJURY: Status: ACTIVE | Noted: 2021-06-14

## 2021-06-14 LAB
ACETAMINOPHEN LEVEL: <5 MCG/ML (ref 10–30)
ALBUMIN SERPL-MCNC: 4 G/DL (ref 3.5–5.2)
ALP BLD-CCNC: 118 U/L (ref 35–104)
ALT SERPL-CCNC: 8 U/L (ref 0–32)
AMPHETAMINE SCREEN, URINE: NOT DETECTED
ANION GAP SERPL CALCULATED.3IONS-SCNC: 10 MMOL/L (ref 7–16)
AST SERPL-CCNC: 17 U/L (ref 0–31)
BACTERIA: ABNORMAL /HPF
BARBITURATE SCREEN URINE: NOT DETECTED
BASOPHILS ABSOLUTE: 0.04 E9/L (ref 0–0.2)
BASOPHILS RELATIVE PERCENT: 0.6 % (ref 0–2)
BENZODIAZEPINE SCREEN, URINE: NOT DETECTED
BILIRUB SERPL-MCNC: 0.3 MG/DL (ref 0–1.2)
BILIRUBIN URINE: NEGATIVE
BLOOD, URINE: NEGATIVE
BUN BLDV-MCNC: 15 MG/DL (ref 6–23)
CALCIUM SERPL-MCNC: 9.8 MG/DL (ref 8.6–10.2)
CANNABINOID SCREEN URINE: NOT DETECTED
CHLORIDE BLD-SCNC: 106 MMOL/L (ref 98–107)
CLARITY: CLEAR
CO2: 26 MMOL/L (ref 22–29)
COCAINE METABOLITE SCREEN URINE: NOT DETECTED
COLOR: YELLOW
CREAT SERPL-MCNC: 1.1 MG/DL (ref 0.5–1)
EOSINOPHILS ABSOLUTE: 0.03 E9/L (ref 0.05–0.5)
EOSINOPHILS RELATIVE PERCENT: 0.4 % (ref 0–6)
EPITHELIAL CELLS, UA: ABNORMAL /HPF
ETHANOL: <10 MG/DL (ref 0–0.08)
FENTANYL SCREEN, URINE: NOT DETECTED
GFR AFRICAN AMERICAN: 56
GFR NON-AFRICAN AMERICAN: 46 ML/MIN/1.73
GLUCOSE BLD-MCNC: 110 MG/DL (ref 74–99)
GLUCOSE URINE: NEGATIVE MG/DL
HCT VFR BLD CALC: 41.8 % (ref 34–48)
HEMOGLOBIN: 13.5 G/DL (ref 11.5–15.5)
IMMATURE GRANULOCYTES #: 0.04 E9/L
IMMATURE GRANULOCYTES %: 0.6 % (ref 0–5)
INFLUENZA A: NOT DETECTED
INFLUENZA B: NOT DETECTED
KETONES, URINE: NEGATIVE MG/DL
LEUKOCYTE ESTERASE, URINE: ABNORMAL
LYMPHOCYTES ABSOLUTE: 1.56 E9/L (ref 1.5–4)
LYMPHOCYTES RELATIVE PERCENT: 22.6 % (ref 20–42)
Lab: NORMAL
MCH RBC QN AUTO: 33.6 PG (ref 26–35)
MCHC RBC AUTO-ENTMCNC: 32.3 % (ref 32–34.5)
MCV RBC AUTO: 104 FL (ref 80–99.9)
METHADONE SCREEN, URINE: NOT DETECTED
MONOCYTES ABSOLUTE: 0.52 E9/L (ref 0.1–0.95)
MONOCYTES RELATIVE PERCENT: 7.5 % (ref 2–12)
NEUTROPHILS ABSOLUTE: 4.7 E9/L (ref 1.8–7.3)
NEUTROPHILS RELATIVE PERCENT: 68.3 % (ref 43–80)
NITRITE, URINE: POSITIVE
OPIATE SCREEN URINE: NOT DETECTED
OXYCODONE URINE: NOT DETECTED
PDW BLD-RTO: 14 FL (ref 11.5–15)
PH UA: 5 (ref 5–9)
PHENCYCLIDINE SCREEN URINE: NOT DETECTED
PLATELET # BLD: 148 E9/L (ref 130–450)
PMV BLD AUTO: 9.9 FL (ref 7–12)
POTASSIUM SERPL-SCNC: 4.6 MMOL/L (ref 3.5–5)
PROTEIN UA: NEGATIVE MG/DL
RBC # BLD: 4.02 E12/L (ref 3.5–5.5)
RBC UA: ABNORMAL /HPF (ref 0–2)
SALICYLATE, SERUM: <0.3 MG/DL (ref 0–30)
SARS-COV-2 RNA, RT PCR: NOT DETECTED
SODIUM BLD-SCNC: 142 MMOL/L (ref 132–146)
SPECIFIC GRAVITY UA: 1.02 (ref 1–1.03)
TOTAL PROTEIN: 7.1 G/DL (ref 6.4–8.3)
TRICYCLIC ANTIDEPRESSANTS SCREEN SERUM: NEGATIVE NG/ML
UROBILINOGEN, URINE: 0.2 E.U./DL
WBC # BLD: 6.9 E9/L (ref 4.5–11.5)
WBC UA: ABNORMAL /HPF (ref 0–5)

## 2021-06-14 PROCEDURE — 99285 EMERGENCY DEPT VISIT HI MDM: CPT

## 2021-06-14 PROCEDURE — 80143 DRUG ASSAY ACETAMINOPHEN: CPT

## 2021-06-14 PROCEDURE — 80307 DRUG TEST PRSMV CHEM ANLYZR: CPT

## 2021-06-14 PROCEDURE — 93005 ELECTROCARDIOGRAM TRACING: CPT | Performed by: EMERGENCY MEDICINE

## 2021-06-14 PROCEDURE — 87636 SARSCOV2 & INF A&B AMP PRB: CPT

## 2021-06-14 PROCEDURE — 81001 URINALYSIS AUTO W/SCOPE: CPT

## 2021-06-14 PROCEDURE — 80053 COMPREHEN METABOLIC PANEL: CPT

## 2021-06-14 PROCEDURE — 80179 DRUG ASSAY SALICYLATE: CPT

## 2021-06-14 PROCEDURE — 1240000000 HC EMOTIONAL WELLNESS R&B

## 2021-06-14 PROCEDURE — 6370000000 HC RX 637 (ALT 250 FOR IP): Performed by: EMERGENCY MEDICINE

## 2021-06-14 PROCEDURE — 85025 COMPLETE CBC W/AUTO DIFF WBC: CPT

## 2021-06-14 PROCEDURE — 82077 ASSAY SPEC XCP UR&BREATH IA: CPT

## 2021-06-14 RX ORDER — CEPHALEXIN 500 MG/1
500 CAPSULE ORAL EVERY 12 HOURS SCHEDULED
Status: DISCONTINUED | OUTPATIENT
Start: 2021-06-14 | End: 2021-06-17 | Stop reason: HOSPADM

## 2021-06-14 RX ORDER — LORAZEPAM 1 MG/1
0.5 TABLET ORAL ONCE
Status: COMPLETED | OUTPATIENT
Start: 2021-06-14 | End: 2021-06-14

## 2021-06-14 RX ADMIN — CEPHALEXIN 500 MG: 500 CAPSULE ORAL at 22:06

## 2021-06-14 RX ADMIN — LORAZEPAM 0.5 MG: 1 TABLET ORAL at 11:55

## 2021-06-14 NOTE — ED PROVIDER NOTES
[facility]  Department of Emergency Medicine   ED  Encounter Note  Admit Date/RoomTime: 2021 11:11 AM  ED Room: [address] [place] [address]    NAME: Kwesi Pryor  : 1925  MRN: [id number]     Chief Complaint:  Suicidal (Pts daughter called PD because pt stated she had nothing to live for anymore. Hx dementia. [place] slipped by PD. DNR CC. )    History of Present Illness       Kwesi Pryor is a 80 y.o. old female who presents to the emergency department for psychiatric evaluation. She reportedly is DNR CC and has history of dementia. Her daughter called the police because the patient made a statement that she had nothing to live for. Patient is currently very distraught and crying in her bed. She tells me that she has to go pee but will not stop crying to tell me much more. When I asked her if she wants to hurt herself or kill herself, she does not give me an answer. Quality:      Hopelessness:   Yes. Terror:   No.     Confusion:   maybe. Hallucinations:   None. Able to care for self: No.  Able to control self: No.    ROS   Pertinent positives and negatives are stated within HPI, all other systems reviewed and are negative. Past Medical History:  has a past medical history of Acid reflux disease, Atrial fibrillation (HCC), AVB (atrioventricular block), COPD (chronic obstructive pulmonary disease) ([address] Dementia ([address] Depression, Hypotension, Intracerebral hematoma ([address] Nausea & vomiting, Seizure disorder ([address] and Syncope and collapse. Surgical History:  has a past surgical history that includes Cholecystectomy; Cardiac catheterization ( or ); Pacemaker insertion (Left, 2014); brain surgery (); Breast surgery; Cataract removal with implant (Bilateral, 10 yrs ago); ERCP (2014); ERCP (14); and Cardioversion (2018). Social History:  reports that she has never smoked.  She has never used smokeless tobacco. She reports that she does not drink alcohol and does not use drugs. Family History: family history includes Cancer in her brother and another family member. Allergies: Patient has no known allergies. Physical Exam   Oxygen Saturation Interpretation: Normal.        ED Triage Vitals [06/14/21 1118]   BP Temp Temp src Pulse Resp SpO2 Height Weight   (!) 180/83 96.9 °F (36.1 °C) -- 107 16 94 % 5' (1.524 m) 115 lb (52.2 kg)         General Appearance:  well-appearing and street clothes. Constitutional:   Level of Consciousness: Awake and alert. ETOH: No.          Distress: mild. Cooperativeness: distracted. Eyes:  PERRL, EOMI, no discharge or conjunctival injection. Ears:  External ears without lesions. Throat:  Pharynx without injection, exudate, or tonsillar hypertrophy. Airway patient. Neck:  Normal ROM. Supple. Respiratory:  Clear to auscultation and breath sounds equal.  CV:  Regular rate and rhythm, normal heart sounds, without pathological murmurs, ectopy, gallops, or rubs. GI:  Abdomen Soft, nontender, good bowel sounds. No firm or pulsatile mass. Back:  No costovertebral tenderness. Integument:  Normal turgor. Warm, dry, without visible rash, unless noted elsewhere. Lymphatics: No lymphangitis or adenopathy noted. Neurological:  Oriented. Motor functions intact. Psychiatric:        Thought Process:       Coherent:  Yes. Delusions / Paranoia: no evidence of paranoia. Flight of ideas:  No.         Rambling conversation:  No.       Affect: tearful. Suicidal ideation:  Made a passive statement. Homicidal ideation:  no homicidal ideation. Perceptions:  denies any perceptual disturbance present.        Lab / Imaging Results   (All laboratory and radiology results have been personally reviewed by myself)  Labs:  Results for orders placed or performed during the hospital encounter of 06/14/21   Comprehensive Metabolic Panel   Result Value Ref Range    Sodium 142 132 - 146 Nitrite, Urine POSITIVE (A) Negative    Leukocyte Esterase, Urine SMALL (A) Negative   Urine Drug Screen   Result Value Ref Range    Amphetamine Screen, Urine NOT DETECTED Negative <1000 ng/mL    Barbiturate Screen, Ur NOT DETECTED Negative < 200 ng/mL    Benzodiazepine Screen, Urine NOT DETECTED Negative < 200 ng/mL    Cannabinoid Scrn, Ur NOT DETECTED Negative < 50ng/mL    Cocaine Metabolite Screen, Urine NOT DETECTED Negative < 300 ng/mL    Opiate Scrn, Ur NOT DETECTED Negative < 300ng/mL    PCP Screen, Urine NOT DETECTED Negative < 25 ng/mL    Methadone Screen, Urine NOT DETECTED Negative <300 ng/mL    Oxycodone Urine NOT DETECTED Negative <100 ng/mL    FENTANYL SCREEN, URINE NOT DETECTED Negative <1 ng/mL    Drug Screen Comment: see below    Microscopic Urinalysis   Result Value Ref Range    WBC, UA 10-20 (A) 0 - 5 /HPF    RBC, UA NONE 0 - 2 /HPF    Epithelial Cells, UA FEW /HPF    Bacteria, UA MANY (A) None Seen /HPF     Imaging: All Radiology results interpreted by Radiologist unless otherwise noted. No orders to display     EKG #1:  Interpreted by emergency department physician unless otherwise noted. Time:  12:54    Rate: 85  Rhythm: Sinus rhythm. Interpretation: with Left Bundle Branch Block. ED Course / Medical Decision Making     Medications   cephALEXin (KEFLEX) capsule 500 mg (has no administration in time range)   LORazepam (ATIVAN) tablet 0.5 mg (0.5 mg Oral Given 6/14/21 1155)        Consult(s):   IP CONSULT TO SOCIAL WORK    Procedure(s):   none    MDM:   Patient presents from home for depression. She is tearful and crying on exam and will not talk to me much. She was found to be UTI treated with Keflex. Remainder of work-up unremarkable. Patient medically cleared awaiting social work disposition.     Plan of Care/Counseling:  Myself: Tess Fraser DO reviewed today's visit with the patient in addition to providing specific details for the plan of care and counseling regarding the diagnosis and prognosis. Questions are answered at this time and are agreeable with the plan. Assessment      1. Depression, unspecified depression type    2. Acute cystitis without hematuria      Plan   Social work eval.  Patient condition is stable    New Medications     New Prescriptions    No medications on file     Electronically signed by Mary Jo Valdez DO   DD: 6/14/21  **This report was transcribed using voice recognition software. Every effort was made to ensure accuracy; however, inadvertent computerized transcription errors may be present.   END OF ED PROVIDER NOTE        Mary Jo Valdez DO  06/14/21 5712

## 2021-06-14 NOTE — ED NOTES
Bed: [facility]  Expected date:   Expected time:   Means of arrival:   Comments:  [id number] Graciela Guido RN  06/14/21 1358

## 2021-06-14 NOTE — ED NOTES
Emergency Department [facility] AN AFFILIATE OF [facility] Biopsychosocial Assessment Note    Chief Complaint:     Patient is a 80year old, female presenting to ED for increased depression & suicidal ideation w/ no plan. Patient reports that she has been severely depressed, crying spells, hopelessness/helplessness, and poor sleep. Patient reports that \"I had a break down today\". Patient reports her main stressor is that \"I don't have anywhere to go, I'm not getting younger, I'm not going to get better from this disease\". Patient reports that her daughter is placing her back into a nursing facility but has not gotten around to doing so. Patient reports that if she goes into a facility, she would be ok with being referred to  [facility], as she has been there in the past.    Patient endorses suicidal ideation, no plan. Patient denies homicidal ideation. MSE: Patient is alert & oriented x 3. Patient mood is depressed, flat affect. Patient thought process/speech are clear. Patient denies A/V hallucinations. Clinical Summary/History:     Patient reports a mental health hx of dementia and depression, currently receives psychiatric medication from her PCP, and patient denies any hx of psychiatric hospitalizations. Patient reports poor sleep, baseline appetite, and endorses increased feelings of hopelessness/helplessness. Patient denies any hx of suicide attempts or self injurious behaviors. Patient denies drug/alcohol use. Gender  [] Male [x] Female [] Transgender  [] Other    Sexual Orientation    [x] Heterosexual [] Homosexual [] Bisexual [] Other    Suicidal Behavioral: CSSR-S Complete. [x] Reports:    [] Past [x] Present   [] Denies    Homicidal/ Violent Behavior  [] Reports:   [] Past [] Present   [x] Denies     Hallucinations/Delusions   [] Reports:   [x] Denies     Substance Use/Alcohol Use/Addiction: SBIRT Screen Complete.    [] Reports:   [x] Denies     Trauma History  [] Reports:  [x] Denies     Collateral Information:       Level of Care/Disposition Plan  [] Home:   [] Outpatient Provider:   [] Crisis Unit:   [x] Inpatient Psychiatric Unit:  [] Other:     SW to pursue inpatient admission for safety/stabilization.      RACHAEL Weaver, Shubham Rios  06/14/21 1812

## 2021-06-15 ENCOUNTER — APPOINTMENT (OUTPATIENT)
Dept: GENERAL RADIOLOGY | Age: 86
DRG: 885 | End: 2021-06-15
Payer: MEDICARE

## 2021-06-15 PROBLEM — J44.9 COPD (CHRONIC OBSTRUCTIVE PULMONARY DISEASE) (HCC): Chronic | Status: ACTIVE | Noted: 2021-06-15

## 2021-06-15 PROBLEM — F33.1 MODERATE EPISODE OF RECURRENT MAJOR DEPRESSIVE DISORDER (HCC): Status: ACTIVE | Noted: 2021-06-15

## 2021-06-15 LAB
CHOLESTEROL, TOTAL: 152 MG/DL (ref 0–199)
HBA1C MFR BLD: 5 % (ref 4–5.6)
HDLC SERPL-MCNC: 70 MG/DL
LDL CHOLESTEROL CALCULATED: 64 MG/DL (ref 0–99)
TRIGL SERPL-MCNC: 91 MG/DL (ref 0–149)
VLDLC SERPL CALC-MCNC: 18 MG/DL

## 2021-06-15 PROCEDURE — 6370000000 HC RX 637 (ALT 250 FOR IP): Performed by: EMERGENCY MEDICINE

## 2021-06-15 PROCEDURE — 97166 OT EVAL MOD COMPLEX 45 MIN: CPT

## 2021-06-15 PROCEDURE — 6370000000 HC RX 637 (ALT 250 FOR IP): Performed by: NURSE PRACTITIONER

## 2021-06-15 PROCEDURE — 97161 PT EVAL LOW COMPLEX 20 MIN: CPT | Performed by: PHYSICAL THERAPIST

## 2021-06-15 PROCEDURE — 97535 SELF CARE MNGMENT TRAINING: CPT

## 2021-06-15 PROCEDURE — 1240000000 HC EMOTIONAL WELLNESS R&B

## 2021-06-15 PROCEDURE — 80061 LIPID PANEL: CPT

## 2021-06-15 PROCEDURE — 97530 THERAPEUTIC ACTIVITIES: CPT | Performed by: PHYSICAL THERAPIST

## 2021-06-15 PROCEDURE — 99222 1ST HOSP IP/OBS MODERATE 55: CPT | Performed by: NURSE PRACTITIONER

## 2021-06-15 PROCEDURE — 83036 HEMOGLOBIN GLYCOSYLATED A1C: CPT

## 2021-06-15 PROCEDURE — 36415 COLL VENOUS BLD VENIPUNCTURE: CPT

## 2021-06-15 PROCEDURE — 71045 X-RAY EXAM CHEST 1 VIEW: CPT

## 2021-06-15 RX ORDER — MEMANTINE HYDROCHLORIDE 10 MG/1
10 TABLET ORAL 2 TIMES DAILY
Refills: 5 | Status: DISCONTINUED | OUTPATIENT
Start: 2021-06-15 | End: 2021-06-17 | Stop reason: HOSPADM

## 2021-06-15 RX ORDER — ACETAMINOPHEN 325 MG/1
650 TABLET ORAL EVERY 4 HOURS PRN
Status: DISCONTINUED | OUTPATIENT
Start: 2021-06-15 | End: 2021-06-17 | Stop reason: HOSPADM

## 2021-06-15 RX ORDER — POTASSIUM CHLORIDE 20 MEQ/1
20 TABLET, EXTENDED RELEASE ORAL
Status: DISCONTINUED | OUTPATIENT
Start: 2021-06-16 | End: 2021-06-17 | Stop reason: HOSPADM

## 2021-06-15 RX ORDER — ASPIRIN 81 MG/1
81 TABLET ORAL DAILY
Status: DISCONTINUED | OUTPATIENT
Start: 2021-06-15 | End: 2021-06-17 | Stop reason: HOSPADM

## 2021-06-15 RX ORDER — MAGNESIUM HYDROXIDE/ALUMINUM HYDROXICE/SIMETHICONE 120; 1200; 1200 MG/30ML; MG/30ML; MG/30ML
30 SUSPENSION ORAL PRN
Status: DISCONTINUED | OUTPATIENT
Start: 2021-06-15 | End: 2021-06-17 | Stop reason: HOSPADM

## 2021-06-15 RX ORDER — ALBUTEROL SULFATE 2.5 MG/3ML
2.5 SOLUTION RESPIRATORY (INHALATION) EVERY 4 HOURS PRN
Status: DISCONTINUED | OUTPATIENT
Start: 2021-06-15 | End: 2021-06-17 | Stop reason: HOSPADM

## 2021-06-15 RX ORDER — CHOLECALCIFEROL (VITAMIN D3) 50 MCG
2000 TABLET ORAL DAILY
Status: DISCONTINUED | OUTPATIENT
Start: 2021-06-15 | End: 2021-06-17 | Stop reason: HOSPADM

## 2021-06-15 RX ORDER — MIRTAZAPINE 15 MG/1
30 TABLET, FILM COATED ORAL NIGHTLY
Status: DISCONTINUED | OUTPATIENT
Start: 2021-06-15 | End: 2021-06-17 | Stop reason: HOSPADM

## 2021-06-15 RX ORDER — HALOPERIDOL 5 MG/ML
3 INJECTION INTRAMUSCULAR EVERY 6 HOURS PRN
Status: DISCONTINUED | OUTPATIENT
Start: 2021-06-15 | End: 2021-06-17 | Stop reason: HOSPADM

## 2021-06-15 RX ORDER — GABAPENTIN 100 MG/1
100 CAPSULE ORAL DAILY
Status: DISCONTINUED | OUTPATIENT
Start: 2021-06-15 | End: 2021-06-17 | Stop reason: HOSPADM

## 2021-06-15 RX ORDER — DEXTRAN 70, GLYCERIN, HYPROMELLOSE 1; 2; 3 MG/ML; MG/ML; MG/ML
1 SOLUTION/ DROPS OPHTHALMIC 4 TIMES DAILY
Status: DISCONTINUED | OUTPATIENT
Start: 2021-06-15 | End: 2021-06-17 | Stop reason: HOSPADM

## 2021-06-15 RX ORDER — HALOPERIDOL 2 MG/1
3 TABLET ORAL EVERY 6 HOURS PRN
Status: DISCONTINUED | OUTPATIENT
Start: 2021-06-15 | End: 2021-06-17 | Stop reason: HOSPADM

## 2021-06-15 RX ORDER — PANTOPRAZOLE SODIUM 40 MG/1
40 TABLET, DELAYED RELEASE ORAL EVERY MORNING
Status: DISCONTINUED | OUTPATIENT
Start: 2021-06-15 | End: 2021-06-17 | Stop reason: HOSPADM

## 2021-06-15 RX ORDER — VIT C/E/ZN/COPPR/LUTEIN/ZEAXAN 60 MG-6 MG
1 CAPSULE ORAL DAILY
Status: DISCONTINUED | OUTPATIENT
Start: 2021-06-15 | End: 2021-06-17 | Stop reason: HOSPADM

## 2021-06-15 RX ADMIN — GABAPENTIN 100 MG: 100 CAPSULE ORAL at 12:46

## 2021-06-15 RX ADMIN — MIRTAZAPINE 30 MG: 15 TABLET, FILM COATED ORAL at 21:07

## 2021-06-15 RX ADMIN — DEXTRAN 70, GLYCERIN, HYPROMELLOSE 1 DROP: 1; 2; 3 SOLUTION/ DROPS OPHTHALMIC at 12:46

## 2021-06-15 RX ADMIN — CEPHALEXIN 500 MG: 500 CAPSULE ORAL at 21:07

## 2021-06-15 RX ADMIN — MEMANTINE HYDROCHLORIDE 10 MG: 10 TABLET, FILM COATED ORAL at 21:07

## 2021-06-15 RX ADMIN — CEPHALEXIN 500 MG: 500 CAPSULE ORAL at 08:40

## 2021-06-15 RX ADMIN — ASPIRIN 81 MG: 81 TABLET, COATED ORAL at 16:10

## 2021-06-15 RX ADMIN — Medication 2000 UNITS: at 12:46

## 2021-06-15 RX ADMIN — MEMANTINE HYDROCHLORIDE 10 MG: 10 TABLET, FILM COATED ORAL at 12:46

## 2021-06-15 RX ADMIN — Medication 1 CAPSULE: at 16:10

## 2021-06-15 RX ADMIN — PANTOPRAZOLE SODIUM 40 MG: 40 TABLET, DELAYED RELEASE ORAL at 12:45

## 2021-06-15 RX ADMIN — DEXTRAN 70, GLYCERIN, HYPROMELLOSE 1 DROP: 1; 2; 3 SOLUTION/ DROPS OPHTHALMIC at 21:07

## 2021-06-15 ASSESSMENT — SLEEP AND FATIGUE QUESTIONNAIRES
DIFFICULTY STAYING ASLEEP: YES
DO YOU HAVE DIFFICULTY SLEEPING: YES
AVERAGE NUMBER OF SLEEP HOURS: 8
AVERAGE NUMBER OF SLEEP HOURS: 6
DO YOU USE A SLEEP AID: NO
RESTFUL SLEEP: NO
DIFFICULTY FALLING ASLEEP: NO
DO YOU USE A SLEEP AID: NO
SLEEP PATTERN: DISTURBED/INTERRUPTED SLEEP
DIFFICULTY ARISING: NO
DO YOU HAVE DIFFICULTY SLEEPING: NO

## 2021-06-15 ASSESSMENT — PAIN SCALES - GENERAL
PAINLEVEL_OUTOF10: 0

## 2021-06-15 ASSESSMENT — LIFESTYLE VARIABLES
HISTORY_ALCOHOL_USE: NO
HISTORY_ALCOHOL_USE: NO

## 2021-06-15 ASSESSMENT — PAIN - FUNCTIONAL ASSESSMENT: PAIN_FUNCTIONAL_ASSESSMENT: 0-10

## 2021-06-15 NOTE — PROGRESS NOTES
[facility]  Initial Interdisciplinary Treatment Plan NOTE    Review Date & Time: 6/15/2021    Patient was in treatment team    Admission Type:   Admission Type: Inpatient    Reason for admission:  Reason for Admission: I got hysterical and the ambulance brought me here. \"      Estimated Length of Stay Update:  3-5 days  Estimated Discharge Date Update: 6/20/2021    PATIENT STRENGTHS:  Patient Strengths Strengths: Communication, Social Skills, Positive Support  Patient Strengths and Limitations:Limitations: Difficulty problem solving/relies on others to help solve problems  Addictive Behavior:Addictive Behavior  In the past 3 months, have you felt or has someone told you that you have a problem with:  : None  Do you have a history of Chemical Use?: No  Do you have a history of Alcohol Use?: No  Do you have a history of Street Drug Abuse?: No  Histroy of Prescripton Drug Abuse?: No  Medical Problems:  Past Medical History:   Diagnosis Date    Acid reflux disease     Atrial fibrillation (HCC)     h/o    AVB (atrioventricular block)     [facility]) [facility]     Dementia ([facility]     Depression     Hypotension     Intracerebral hematoma ([facility] 2009    d/t fall    Nausea & vomiting     Seizure disorder ([facility] 2009    related to intracranial bleed    Syncope and collapse 5/25/2014       EDUCATION:   Learner Progress Toward Treatment Goals: Reviewed results and recommendations of this team, Reviewed group plan and strategies, Reviewed signs, symptoms and risk of self harm and violent behavior and Reviewed goals and plan of care    Method: Small group    Outcome: Verbalized understanding and Needs reinforcement    PATIENT GOALS:  Did  Not set a goal for today    PLAN/TREATMENT RECOMMENDATIONS UPDATE: will support patient in setting and obtaining both short term and long term goals while on the unit    GOALS UPDATE:   Time frame for Short-Term Goals: 1-3 days    Annmarie Diallo Marrero

## 2021-06-15 NOTE — PROGRESS NOTES
Patient observed resting in bed comfortably, breathing relaxed and unlabored. Patient denies being in any distress. Patient's SpO2 98% on room air. Will continue to monitor.

## 2021-06-15 NOTE — ED NOTES
SW called patient daughter, Leonarda Pedraza [phone] patient mother memory has been worsening and patient has been crying non-stop excessively between last night through this morning. Daughter expressed interest in [facility], [address] [place] [address],[address]. Denies referral for [employer].       RACHAEL Gongora, [facility]  06/14/21 2120

## 2021-06-15 NOTE — CARE COORDINATION
Renetta spoke with Yolanda PURCELL. Staff asking for permission to access pt's documentation to see if the pt is appropriate for placement. Sw agreed for continuation of care.

## 2021-06-15 NOTE — CARE COORDINATION
Renetta spoke with pt's daughter, Miguel Angel Douglass. Annettaanabell Rafat states that this pt is unable to return to her home because she is no longer able to meet her care needs. Pt will need nursing home placement. Sw will complete a PASRR.

## 2021-06-15 NOTE — CARE COORDINATION
Biopsychosocial Assessment Note    Social work met with patient to complete the biopsychosocial assessment and CSSR-S. Mental Status Exam: Pt alert and oriented x 2. Pt was pleasantly confused throughout assessment. Pt's thought process was preoccupied, speech was clear. Chief Complaint: \"Patient is a 80year old, female presenting to ED for increased depression & suicidal ideation w/ no plan. Patient reports that she has been severely depressed, crying spells, hopelessness/helplessness, and poor sleep. Patient reports that \"I had a break down today\". Patient reports her main stressor is that \"I don't have anywhere to go, I'm not getting younger, I'm not going to get better from this disease\". Patient reports that her daughter is placing her back into a nursing facility but has not gotten around to doing so. Patient reports that if she goes into a facility, she would be ok with being referred to  [address] [place] [address], as she has been there in the past.\"    Patient Report: Pt states that this is her first psychiatric admission. Pt no longer endorsing suicidal ideation. Pt not admitting to ever endorsing suicidal ideation. Pt was mostly focused on the way she feels medically. Pt states that she doesn't feel \"up to par\" today. Pt states that she would like to return home with her daughter, but if she is unable to, she is willing to discharge to [address] [place] [address], as she has been there in the past. Pt denied suicide attempt history. Pt currently denying SI/ HI/ hallucinations/delusions. Pt denied trauma history. Pt denied substance use.      Gender  [] Male [x] Female [] Transgender  [] Other    Sexual Orientation    [x] Heterosexual [] Homosexual [] Bisexual [] Other    Suicidal Ideation  [] Past [] Present [x] Denies     Homicidal Ideation  [] Past [] Present [x] Denies     Hallucinations/Delusions (Specify type)  [] Reports [x] Denies     Substance Use/Alcohol Use/Addiction  [] Reports [x] Denies     Tobacco Use

## 2021-06-15 NOTE — PROGRESS NOTES
OCCUPATIONAL THERAPY INITIAL EVALUATION    [address] [address] [address]      Date:6/15/2021                                                  Patient Name: Vicky Sosa  MRN: [id number]  : 1925  Room: [address] [place] [address]    Evaluating OT: Vinnie Orourke, [address] [place], OTR/L 125504  Referring Provider:CASSIE Blackmon CNP  Specific Provider Orders: OT eval and treat 6/15  Recommended Adaptive Equipment: TBD     Diagnosis: suicidal behavior with attempted self injury   Surgery: n/a   Pertinent Medical History: COPD, a-fib, dementia, hypotension, intracerebral hematoma, AVB   Precautions:  Fall Risk, monitor vitals    Assessment of current deficits   [x] Functional mobility  [x]ADLs  [x] Strength               [x]Cognition   [x] Functional transfers   [x] IADLs         [x] Safety Awareness   [x]Endurance   [] Fine Coordination              [x] Balance      [] Vision/perception   [x]Sensation    []Gross Motor Coordination  [] ROM  [] Delirium                   [] Motor Control     OT PLAN OF CARE   OT POC based on physician orders, patient diagnosis and results of clinical assessment    Frequency/Duration: 2-3 days/wk for 2 weeks PRN   Specific OT Treatment to include:   * Instruction/training on adapted ADL techniques and AE recommendations to increase functional independence within        precautions  * Training on energy conservation strategies, correct breathing pattern and techniques to improve independence/tolerance for self-care routine  * Functional transfer/mobility training/DME recommendations for increased independence, safety, and fall prevention  * Patient/Family education to increase follow through with safety techniques and functional independence  * Recommendation of environmental modifications for increased safety with functional transfers/mobility and ADLs  * Cognitive retraining/development of therapeutic activities to improve problem solving, judgement, memory, and attention for increased safety/participation in ADL/IADL tasks  * Therapeutic exercise to improve motor endurance, ROM, and functional strength for ADLs/functional transfers  * Therapeutic activities to facilitate/challenge dynamic balance, stand tolerance for increased safety and independence with ADLs  * Neuro-muscular re-education: facilitation of righting/equilibrium reactions, midline orientation, scapular stability/mobility, normalization of muscle tone, and facilitation of volitional active controled movement    Home Living: Pt lives with daughter in a 1 story home; bed/bath on main level   Bathroom setup: n/a because pt reports she sponge bathes   Equipment owned: none  Prior Level of Function: IND with ADLs/IADLs; using ww PRN for functional mobility   Driving: no    Pain Level: 0/10  Cognition: A&O: 1/4 (self);  Follows 1 step directions, with repetition and increased time   Memory:  fair    Sequencing:  fair    Problem solving:  fair    Judgement/safety:  fair     Functional Assessment:  AM-PAC Daily Activity Raw Score: 18/24   Initial Eval Status  Date: 6/15/21 Treatment Status  Date: STGs=LTGs  Time Frame: 10-14 days   Feeding Set-up   IND   Grooming Min A (standing at sink)   SBA   UB Dressing Min A (due to limited ROM)   SBA   LB Dressing Min A (pt able to cross BLEs to doff/kyle B socks/shoes)  SBA    Bathing Min A (simulated)  SBA    Toileting Min A (assist with balance for pants management)  SBA   Bed Mobility  Log roll: NT  Supine to sit: NT   Sit to supine: SBA   Log roll IND  Supine to sit: IND   Sit to supine: IND   Functional Transfers Sit to stand:Min A   Stand to sit:Min A  Commode: Min A  SBA   Functional Mobility Min A (hand held assist, to/from bathroom)  SBA   Balance Sitting: SBA  Standing: Min A     Activity Tolerance fair     Visual/  Perceptual Glasses: yes                UE ROM: BUE: elbow flex WFL, shoulder flex grossly 100'  Strength: RUE: grossly 3+/5 LUE: grossly 3+/5   Strength: B WFL  Fine Motor Coordination:  WFL     Hearing: WFL  Sensation:  No c/o numbness/tingling   Tone:  WFL  Edema: none noted                            Comments:Cleared by RN to see pt. Upon arrival, patient sitting in dining area and agreeable to OT session. At end of session, patient supine in bed with call light and phone within reach, all lines and tubes intact. Pt would benefit from continued OT to increase functional independence and quality of life. Treatment:  Pt required re-orientation to year/place. SpO2 initially 91% RA while sitting in dining area. Pt required vc's and physical assist for proper technique/safety with hand placement/body mechanics/posture for functional mobiltiy to . Pt asked to use the bathroom. With functional mobility, pt's LUE appeared \"jerky\". When sitting on commode, pt was SOB, appeared to be getting anxious, and holding chest. HR was 100 the entire session. While on commode, RN notified to urgently come in to check BP. BP was 145/74. Pt demo'ing fair understanding of education provided. Continue to educate. Eval Complexity: moderate  · History: Expanded chart review of medical records and additional review of physical, cognitive, or psychosocial history related to current functional performance  · Exam: 3+ performance deficits  · Assistance/Modification: Min/mod assistance or modifications required to perform tasks. May have comorbidities that affect occupational performance. Rehab Potential: Good for established goals, pt. assisted in establishment of goals. LTG: maximize independence with ADLs to return to PLOF    Patient instructed on diagnosis, prognosis/goals and plan of care. Demonstrated fair understanding. [] Malnutrition indicators have been identified and nursing has been notified to ensure a dietitian consult is ordered.      Evaluation time includes thorough review of current medical information, gathering information on past medical & social history & PLOF, completion of standardized testing, informal observation of tasks, consultation with other medical professions/disciplines, assessment of data & development of POC/goals.      Time In: 1:30       Time Out: 1:50     Total treatment time: 10       Treatment Charges: Mins Units   OT Eval Low 59238     OT Eval Medium 94152 X    OT Eval High 36376     OT Re-Eval [id number]     Ther Ex  58018       Manual Therapy 95289       Thera Activities 03812       ADL/Home Mgt 03538 10 1   Neuro Re-ed 73826       Group Therapy        Orthotic manage/training  81348       Non-Billable Time           Rita Howard OTR/L [id number]

## 2021-06-15 NOTE — H&P
Department of Psychiatry  History and Physical - Adult     CHIEF COMPLAINT: \"I do not remember what I said. \"    Patient was seen after discussing with the treatment team and reviewing the chart    CIRCUMSTANCES OF ADMISSION:   She was presented to [facility] emergency department via EMS after crying at home and endorsing increased depression with suicidal ideation her daughter called an ambulance and brought her to the emergency room for assessment. The patient was pink slipped. HISTORY OF PRESENT ILLNESS:      The patient is a 80 y.o. female with significant past history of GERD, valvular heart disease, benign essential tremor, dementia, CKD, AV block, peripheral neuropathy, hypertension, atrial fibrillation, congestive heart failure, history of cardiac pacemaker, gait difficulty and major depression. Was presented to [facility] emergency department by EMS after becoming increasingly depressed with suicidal ideation and crying at home, her daughter called for an ambulance and brought the patient to the emergency room for psychiatric assessment. The patient was pink slipped. She was medically cleared in the emergency department toxicology is negative QTC is 464 and she was admitted to [facility] for psychiatric evaluation and stabilization. Upon assessment today the patient is polite and cooperative to the best of her ability given her dementia. She is a poor historian, tells me that \"I do not remember what happened or why I am here. \"  When reminded that she had made a suicidal statement she states \"I do not remember what I said. \"  Patient does not seem to have any insight as to why she required hospitalization and psychiatric treatment. She states that she has had depression a long time. Her affect is blunted and depressed. She endorses feeling depressed at this time. She denies feeling suicidal at the current.   She is reporting that her living situation with her daughter is not the best.  She is requesting to go back to [facility]. She denies auditory or visual hallucinations she does not appear overtly or covertly psychotic or paranoid. She denies suicidal or homicidal ideation intent or plan. History is limited due to patient telling me that she is tired, she then began experiencing difficulty in answering questions and went to sleep. Past psychiatric history:  Per chart review has no past psychiatric admissions. Denies suicidal ideation has no past suicide attempts. States that she does have a history of depression and is being treated by Dr. Rayshawn Kennedy her PCP with Remeron 30 mg nightly for depression. Patient also has a diagnosis of dementia in which she is treated with Namenda. Family psychiatric history:  Patient does not endorse any family psychiatric history. Does not endorse any history of family suicides. Substance abuse history:  Patient denies any illicit substance abuse or use    Legal history:  Denies    Personal family social history:  History is limited due to patient's ability to participate in assessment. She has at least 1 daughter that she lives with. She spent 8 years in [address] and then moved out of the nursing facility to stay with her daughter. She states that at the arrangement at the nursing home was better for her than living with her daughter. She is asking to return to the nursing home.           Past Medical History:        Diagnosis Date    Acid reflux disease     Atrial fibrillation ([facility]     h/o    AVB (atrioventricular block)     COPD (chronic obstructive pulmonary disease) (HCC)     D[facility] ([facility])     Depression     Hypotension     Intracerebral hematoma ([facility] 2009    d/t fall    Nausea & vomiting     Seizure disorder ([facility] 2009    related to intracranial bleed    Syncope and collapse 5/25/2014       Medications Prior to Admission:   Medications Prior to Admission: potassium chloride (KLOR-CON M) 20 MEQ extended release tablet, TAKE 1 TABLET BY MOUTH TWICE DAILY WITH MEALS  LORazepam (ATIVAN) 1 MG tablet, TAKE 1 TABLET BY MOUTH EVERY 8 HOURS AS NEEDED FOR ANXIETY  Cholecalciferol (VITAMIN D3) 50 MCG (2000 UT) CAPS, TAKE 1 CAPSULE BY MOUTH TWICE DAILY  memantine ER (NAMENDA XR) 28 MG CP24 extended release capsule, TAKE ONE CAPSULE BY MOUTH DAILY  mirtazapine (REMERON) 30 MG tablet, TAKE 1 TABLET BY MOUTH NIGHTLY  loratadine (CLARITIN) 10 MG tablet, TAKE 1 TABLET BY MOUTH EVERY MORNING  pantoprazole (PROTONIX) 40 MG tablet, TAKE 1 TABLET BY MOUTH EVERY MORNING  aspirin EC 81 MG EC tablet, Take 1 tablet by mouth daily  gabapentin (NEURONTIN) 100 MG capsule, TAKE ONE CAPSULE BY MOUTH EVERY MORNING  VENTOLIN  (90 Base) MCG/ACT inhaler, INHALE 2 PUFFS INTO THE LUNGS FOUR TIMES DAILY  Multiple Vitamins-Minerals (OCUVITE-LUTEIN) TABS oral tablet, Take 1 tablet by mouth daily  acetaminophen (TYLENOL) 325 MG tablet, Take 2 tablets by mouth every 6 hours as needed for Pain  Elastic Bandages & Supports (MEDICAL COMPRESSION STOCKINGS) MISC, 1 each by Does not apply route daily 20-30 mmHg. Knee high.  Dx Venous insufficiency  [DISCONTINUED] diclofenac sodium 1 % GEL, Apply 4 g topically 4 times daily Apply to right chest wall  Handicap Placard MISC, by Does not apply route Patient cannot walk 200 ft without stopping to rest.   Expiration 5 yrs  ibuprofen (ADVIL;MOTRIN) 200 MG tablet, Take 200 mg by mouth every 6 hours as needed for Pain  Sodium Phosphates (ENEMA DISPOSABLE) ENEM, Place 1 enema rectally as needed  hypromellose (ARTIFICIAL TEARS) 0.4 % SOLN ophthalmic solution, Place 1 drop into both eyes 4 times daily   Handicap Placard MISC, by Does not apply route Patient cannot walk 200 ft without stopping to rest.   Expiration 5 yrs  magnesium hydroxide (MILK OF MAGNESIA) 400 MG/5ML suspension, Take 30 mLs by mouth daily as needed for Constipation     Past Surgical History:        Procedure Laterality Date    BRAIN SURGERY superficially forthcoming and cooperative for assessment to the best of her ability given her advanced dementia. Psychomotor reveals no retardation agitation involuntary movement or abnormal posture. Speech is clear, she is able to answer questions with relevance and there is no delayed or long latency of response. Eye contact is average. Mood is \"sad. \"  Affect is somewhat blunted and depressed congruent with stated mood. Thought process is pleasantly confused. Thought content is pleasantly confused. Devoid of suicidal or homicidal ideation intent or plan. Recent, immediate and remote memory are impaired due to advanced dementia. Insight judgment impulse control are marginal at baseline. She is alert to self and place, she is unable to recount events leading to her hospitalization. DIAGNOSIS:  Moderate episode of recurrent major depressive disorder (HCC)  Dementia    LABS: REVIEWED TODAY:  Recent Labs     06/14/21  1144   WBC 6.9   HGB 13.5        Recent Labs     06/14/21  1144      K 4.6      CO2 26   BUN 15   CREATININE 1.1*   GLUCOSE 110*     Recent Labs     06/14/21  1144   BILITOT 0.3   ALKPHOS 118*   AST 17   ALT 8     Lab Results   Component Value Date    LABAMPH NOT DETECTED 06/14/2021    BARBSCNU NOT DETECTED 06/14/2021    LABBENZ NOT DETECTED 06/14/2021    LABMETH NOT DETECTED 06/14/2021    OPIATESCREENURINE NOT DETECTED 06/14/2021    PHENCYCLIDINESCREENURINE NOT DETECTED 06/14/2021    ETOH <10 06/14/2021     Lab Results   Component Value Date    TSH 3.080 05/13/2020     No results found for: LITHIUM  No results found for: VALPROATE, CBMZ  No results found for: LITHIUM, VALPROATE      Radiology No results found. TREATMENT PLAN:  The patient's diagnosis, treatment plan, medication management were formulated after patient was seen directly by the attending physician and myself and all relevant documentation was reviewed.       Risk Management: Based on the diagnosis and assessment biopsychosocial treatment model was presented to the patient and was given the opportunity to ask any question. The patient was agreeable to the plan and all the patient's questions were answered to the patient's satisfaction. I discussed with the patient the risk, benefit, alternative and common side effects for the proposed medication treatment. The patient is consenting to this treatment. Collateral Information:  Will obtain collateral information from the family or friends. Will obtain medical records as appropriate from out patient providers  Will consult the hospitalist for a physical exam to rule out any co-morbid physical condition. Home medication Reconciled   Medications continued as indicated    New Medications started during this admission :    Continue Remeron 30 mg at at bedtime for depression  Continue Namenda 10 mg twice daily for cognitive function    Prn Haldol 5mg and Vistaril 50mg q6hr for extreme agitation. Trazodone as ordered for insomnia  Vistaril as ordered for anxiety      Psychotherapy:   Encourage participation in milieu and group therapy  Individual therapy as needed    NOTE: This report was transcribed using voice recognition software. Every effort was made to ensure accuracy; however, inadvertent computerized transcription errors may be present. Behavioral Services  Medicare Certification Upon Admission    I certify that this patient's inpatient psychiatric hospital admission is medically necessary for:    [x] (1) Treatment which could reasonably be expected to improve this patient's condition,       [x] (2) Or for diagnostic study;     AND     [x](2) The inpatient psychiatric services are provided while the individual is under the care of a physician and are included in the individualized plan of care.     Estimated length of stay/service 5 to 10 days based on stability  Plan for post-hospital care follow-up with outpatient provider    Electronically signed by CASSIE Lopez - CNP on 6/15/2021 at 11:43 AM

## 2021-06-15 NOTE — PROGRESS NOTES
Paged Dr. Boo Tom regarding patient complaining of dizziness and feeling SOB. Patient reports feeling light headed when changing positions, especially from sitting to standing. Orthostatic VS obtained and reported to Dr. Boo Tom. Doctor would like staff to encourage patient to increase fluid intake. Patient assisted back to bed and is resting comfortably at this time. Bed alarm on and x3 side rails in place. No new orders received at this time.

## 2021-06-15 NOTE — PROGRESS NOTES
Physical Therapy  Physical Therapy Initial Assessment     Name: Vicky Sosa  : 1925  MRN: [id number]      Date of Service: 6/15/2021    Lyndon Sparks, PT, DPT  [id number]      Room #:  7302/7302-B  Diagnosis:  Suicidal behavior without attempted self-injury [R45.89]  PMHx/PSHx:  Hypotension, syncope and collapse, seizure disorder, Afib, intracerebral hematoma, depression, acid reflux, COPD, dementia, AV block, cardiac catheterization, brain surgery, cataract removal with implant  Procedure/Surgery:  None this admission  Precautions:  Falls, cognition, bed alarm  Equipment Needs:  TBD    SUBJECTIVE:    Pt lives with her daughter in a 3 story house with 2 stairs to enter and B rails. Pt stays on first floor of home. Bed is on first floor and bath is on first floor. Pt ambulated with no AD indoors and rollator for outdoor/community mobility PTA. OBJECTIVE:   Initial Evaluation  Date: 6/15/21 Treatment Short Term/ Long Term   Goals   AM-PAC 6 Clicks      Was pt agreeable to Eval/treatment? yes     Does pt have pain? Mild chest tightness     Bed Mobility  Rolling: NT  Supine to sit: NT  Sit to supine: NT  Scooting: SBA  Rolling: Mod Independent   Supine to sit: Mod Independent   Sit to supine: Mod Independent   Scooting: Mod Independent    Transfers Sit to stand: Min A  Stand to sit: Min A  Stand pivot: Min A with FWW  Sit to stand: Mod Independent   Stand to sit: Mod Independent   Stand pivot: Mod Independent    Ambulation    15 feet x2 reps, 25 feet with FWW with Min<>Mod A  >50 feet with AAD with Mod Independent    Stair negotiation: ascended and descended  NT  2 steps with 2 rails Mod Independent    ROM BUE:  WNL  BLE:  WNL     Strength BUE:  WNL  BLE:  WNL     Balance Sitting EOB:  Supervision  Dynamic Standing:  Min A<>Mod A with FWW  Sitting EOB:  Independent   Dynamic Standing:   Mod Independent with AAD     Pt is A & O x 3  Sensation:  Pt denies numbness and tingling to extremities  Edema: unremarkable    Vitals:  89% on RA post amb to restroom, recovered to 93% with 1 min PLB on RA  93% post session    Patient education  Pt educated on role of therapy, safety with mobility, transfer technique, gait mechanics and safe use of walker, breathing technique    Patient response to education:   Pt verbalized understanding Pt demonstrated skill Pt requires further education in this area   yes With assist yes     ASSESSMENT:    Conditions Requiring Skilled Therapeutic Intervention:    []Decreased strength     []Decreased ROM  [x]Decreased functional mobility  [x]Decreased balance   [x]Decreased endurance   [x]Decreased posture  []Decreased sensation  []Decreased coordination   []Decreased vision  [x]Decreased safety awareness   []Increased pain       Comments:  Pt seated EOB attempting to stand due to urgent need for use of restroom upon therapist arrival. PT assisted to don shoes prior to standing and was assisted to amb to restroom with use of walker. Pt experiencing SOB and shakiness of B UEs throughout initial amb. Symptoms decreased with PLB and seated rest break on toilet. Pt was able to amb with improved gait to return to EOB. While seated EOB pt educated in PLB. She was able to amb to doorway of room prior to onset of second \"spell\" of SOB and shakiness. When symptoms began pt experienced breakdown of balance and safety awareness requiring increased support to maintain standing balance and to amb back to bedside. Within 2 min of sitting EOB symptoms resolved. Pt remained EOB upon completion of session with alarm applied and needs in reach. Pt educated to not attempt amb without assist due to balance and safety deficits. She is functioning below reported baseline at this time and will benefit from continued skilled PT services to improve independence with all transfers, amb, balance training, and gait with AAD.     Treatment:  Patient practiced and was instructed in the following treatment:    · Transfer training: cues for safe hand placement, manual assist for balance and walker management  · Gait training: cues for upright posture and walker management, cues for improved safety and breathing technique    Pt's/ family goals   1. To return home with daughter    Prognosis is good for reaching above PT goals. Patient and or family understand(s) diagnosis, prognosis, and plan of care. yes    PHYSICAL THERAPY PLAN OF CARE:    PT POC is established based on physician order and patient diagnosis     Referring provider/PT Order:    06/15/21 1030  PT eval and treat Start: 06/15/21 1030, End: 06/15/21 1030, ONE TIME, Standing Count: 1 Occurrences, R      Bianca Andersen, APRN - CNP     Diagnosis:  Suicidal behavior without attempted self-injury [R45.89]  Specific instructions for next treatment:  Progress mobility with AAD as tolerated    Current Treatment Recommendations:     [x] Strengthening to improve independence with functional mobility   [] ROM to improve independence with functional mobility   [x] Balance Training to improve static/dynamic balance and to reduce fall risk  [x] Endurance Training to improve activity tolerance during functional mobility   [x] Transfer Training to improve safety and independence with all functional transfers   [x] Gait Training to improve gait mechanics, endurance and asses need for appropriate assistive device  [] Stair Training in preparation for safe discharge home and/or into the community   [] Positioning to prevent skin breakdown and contractures  [x] Safety and Education Training   [x] Patient/Caregiver Education   [] HEP  [] Other     PT long term treatment goals are located in above grid    Frequency of treatments: 2-5x/week x 1-2 weeks.     Time in  1350  Time out  1310    Total Treatment Time  10 minutes     Evaluation Time includes thorough review of current medical information, gathering information on past medical history/social history and prior level of function, completion of standardized testing/informal observation of tasks, assessment of data and education on plan of care and goals.     CPT codes:  [x] Low Complexity PT evaluation 58216  [] Moderate Complexity PT evaluation 22752  [] High Complexity PT evaluation 11916  [] PT Re-evaluation 85455  [] Gait training 26596 0 minutes  [] Manual therapy 74413 0 minutes  [x] Therapeutic activities 57200 10 minutes  [] Therapeutic exercises 17198 0 minutes  [] Neuromuscular reeducation 58581 0 minutes     Jens Arizmendi, PT, DPT  [id number]

## 2021-06-15 NOTE — PROGRESS NOTES
This RN was notified by physical therapy that patient's SpO2 dropped and patient was short of breath. PT reported that patient's SpO2 was 91% on room air. After ambulating from the dining room to the bathroom, patient became short of breath. Patient observed sitting on the toilet, shaking and breathing heavily. VS obtained and WDL. Patient assisted back to bed x2 staff members. Per patient, \"This happens all the time. \" Patient resting in bed comfortably, no requests at this time. Bed alarm activated and side rails x3 up. Called and updated Maury lópez NP on patient's status.

## 2021-06-15 NOTE — ED NOTES
Pt report called to JIMMY Thorpe w/ all questions answered at this time.       Cecilia Fox RN  06/14/21 1112

## 2021-06-15 NOTE — ED NOTES
Buster Polanco in lab contacted about COVID results and he stated it was Negative at this time.        Bandar Rodriguez RN  06/14/21 5500

## 2021-06-15 NOTE — BH NOTE
`[facility]  Admission Note     Patient arrived to unit per ANA PAULA. Patient calm and cooperative. Affect flat, sad, brightens with conversation. Mood depressed. Denies SI/HI/AVH. Alert to self and place only, intermittently confused. Patient evasive during assessment. When asked about reason for admission, patient states, \"I got hysterical and the ambulance brought me here. \" Patient also reports to have been isolated d/t COVID and not being able to get out which has been contributing to her increase in depression. Patient states, \"I've been depressed lately, but yesterday morning I don't know what happened. I got a little hyper, I couldn't stop crying, my daughter called the ambulance and they brought me here. \" Patient currently residing with daughter and family members for about 8 months. She also reports she was staying at [address] [place] [address] for eight years prior to living with her daughter and believes that was a better living situation for her. Patient requesting to return to [place] at discharge. Patient has hx of dementia and depression. No hx of SA or psychiatric inpatient hospitalizations. Admission Type:   Admission Type: Inpatient    Reason for admission:  Reason for Admission: I got hysterical and the ambulance brought me here. \"    PATIENT STRENGTHS:  Strengths: Connection to output provider, Medication Compliance, Positive Support, Social Skills    Patient Strengths and Limitations:  Limitations: Tendency to isolate self, Perceives need for assistance with self-care    Addictive Behavior:   Addictive Behavior  In the past 3 months, have you felt or has someone told you that you have a problem with:  : None  Do you have a history of Chemical Use?: No  Do you have a history of Alcohol Use?: No  Do you have a history of Street Drug Abuse?: No  Histroy of Prescripton Drug Abuse?: No    Medical Problems:   Past Medical History:   Diagnosis Date    Acid reflux disease     Atrial fibrillation ([facility]     h/o    AVB (atrioventricular block)     COPD (chronic obstructive pulmonary disease) (HCC)     Dementia (HCC)     Depression     Hypotension     [facility] 2009    d/t fall    Nausea & vomiting     Seizure disorder ([facility] 2009    related to intracranial bleed    Syncope and collapse 5/25/2014       Status EXAM:  Status and Exam  Normal: No  Facial Expression: Sad  Affect: Congruent  Level of Consciousness: Alert  Mood:Normal: No  Mood: Depressed  Motor Activity:Normal: No  Motor Activity: Decreased  Interview Behavior: Cooperative, Evasive  Preception: Ballinger to Person, Ballinger to Place  Attention:Normal: No  Attention: Distractible  Thought Processes: Circumstantial  Thought Content:Normal: Yes  Hallucinations: None  Delusions: No  Memory:Normal: No  Memory: Poor Recent, Poor Remote  Insight and Judgment: No  Insight and Judgment: Poor Judgment, Poor Insight  Present Suicidal Ideation: No  Present Homicidal Ideation: No    Tobacco Screening:  Practical Counseling, on admission, agustín X, if applicable and completed (first 3 are required if patient doesn't refuse):            ( )  Recognizing danger situations (included triggers and roadblocks)                    ( )  Coping skills (new ways to manage stress, exercise, relaxation techniques, changing routine, distraction)                                                           ( )  Basic information about quitting (benefits of quitting, techniques in how to quit, available resources  ( ) Referral for counseling faxed to [facility]                                           ( ) Patient refused counseling  (X) Patient has not smoked in the last 30 days    Metabolic Screening:    No results found for: LABA1C    Lab Results   Component Value Date    CHOL 173 08/09/2016    CHOL 203 (H) 06/26/2014     Lab Results   Component Value Date    TRIG 214 (H) 08/09/2016    TRIG 78 07/12/2014    TRIG 106 06/26/2014

## 2021-06-16 LAB
EKG ATRIAL RATE: 85 BPM
EKG P AXIS: 60 DEGREES
EKG P-R INTERVAL: 122 MS
EKG Q-T INTERVAL: 390 MS
EKG QRS DURATION: 136 MS
EKG QTC CALCULATION (BAZETT): 464 MS
EKG R AXIS: 56 DEGREES
EKG T AXIS: -139 DEGREES
EKG VENTRICULAR RATE: 85 BPM

## 2021-06-16 PROCEDURE — 99231 SBSQ HOSP IP/OBS SF/LOW 25: CPT | Performed by: NURSE PRACTITIONER

## 2021-06-16 PROCEDURE — 6370000000 HC RX 637 (ALT 250 FOR IP): Performed by: NURSE PRACTITIONER

## 2021-06-16 PROCEDURE — 6370000000 HC RX 637 (ALT 250 FOR IP): Performed by: EMERGENCY MEDICINE

## 2021-06-16 PROCEDURE — 1240000000 HC EMOTIONAL WELLNESS R&B

## 2021-06-16 RX ORDER — LOPERAMIDE HYDROCHLORIDE 2 MG/1
2 CAPSULE ORAL 4 TIMES DAILY PRN
Status: DISCONTINUED | OUTPATIENT
Start: 2021-06-16 | End: 2021-06-17 | Stop reason: HOSPADM

## 2021-06-16 RX ADMIN — Medication 1 CAPSULE: at 09:31

## 2021-06-16 RX ADMIN — DEXTRAN 70, GLYCERIN, HYPROMELLOSE 1 DROP: 1; 2; 3 SOLUTION/ DROPS OPHTHALMIC at 13:20

## 2021-06-16 RX ADMIN — Medication 2000 UNITS: at 09:31

## 2021-06-16 RX ADMIN — ASPIRIN 81 MG: 81 TABLET, COATED ORAL at 09:31

## 2021-06-16 RX ADMIN — MEMANTINE HYDROCHLORIDE 10 MG: 10 TABLET, FILM COATED ORAL at 21:12

## 2021-06-16 RX ADMIN — MEMANTINE HYDROCHLORIDE 10 MG: 10 TABLET, FILM COATED ORAL at 09:31

## 2021-06-16 RX ADMIN — GABAPENTIN 100 MG: 100 CAPSULE ORAL at 09:31

## 2021-06-16 RX ADMIN — POTASSIUM CHLORIDE 20 MEQ: 1500 TABLET, EXTENDED RELEASE ORAL at 09:32

## 2021-06-16 RX ADMIN — CEPHALEXIN 500 MG: 500 CAPSULE ORAL at 09:31

## 2021-06-16 RX ADMIN — LOPERAMIDE HYDROCHLORIDE 2 MG: 2 CAPSULE ORAL at 11:24

## 2021-06-16 RX ADMIN — PANTOPRAZOLE SODIUM 40 MG: 40 TABLET, DELAYED RELEASE ORAL at 09:32

## 2021-06-16 RX ADMIN — CEPHALEXIN 500 MG: 500 CAPSULE ORAL at 21:12

## 2021-06-16 RX ADMIN — MIRTAZAPINE 30 MG: 15 TABLET, FILM COATED ORAL at 21:12

## 2021-06-16 RX ADMIN — DEXTRAN 70, GLYCERIN, HYPROMELLOSE 1 DROP: 1; 2; 3 SOLUTION/ DROPS OPHTHALMIC at 21:13

## 2021-06-16 RX ADMIN — DEXTRAN 70, GLYCERIN, HYPROMELLOSE 1 DROP: 1; 2; 3 SOLUTION/ DROPS OPHTHALMIC at 16:15

## 2021-06-16 RX ADMIN — DEXTRAN 70, GLYCERIN, HYPROMELLOSE 1 DROP: 1; 2; 3 SOLUTION/ DROPS OPHTHALMIC at 09:32

## 2021-06-16 ASSESSMENT — PAIN SCALES - GENERAL: PAINLEVEL_OUTOF10: 0

## 2021-06-16 NOTE — PROGRESS NOTES
Patient denies SI/HI/Hallucinations, anxiety and depression. Patient bright and pleasant. Patient has not complained of feeling short of breath or dizzy so far this shift. Patient isolative to her room at times, briefly socializes while out on the unit. Patient hard of hearing. Patient interested in discharging to [facility] at discharge. Patient taking prescribed medications, eating provided meals, and attending select groups.

## 2021-06-16 NOTE — PROGRESS NOTES
 Not on file   Social History Narrative    Not on file     Social Determinants of Health     Financial Resource Strain:     Difficulty of Paying Living Expenses:    Food Insecurity:     Worried About Running Out of Food in the Last Year:     [address] in the Last Year:    Transportation Needs:     Lack of Transportation (Medical):  Lack of Transportation (Non-Medical):    Physical Activity:     Days of Exercise per Week:     Minutes of Exercise per Session:    Stress:     Feeling of Stress :    Social Connections:     Frequency of Communication with Friends and Family:     Frequency of Social Gatherings with Friends and Family:     Attends Denominational Services:     Active Member of Clubs or Organizations:     Attends Club or Organization Meetings:     Marital Status:    Intimate Partner Violence:     Fear of Current or Ex-Partner:     Emotionally Abused:     Physically Abused:     Sexually Abused:            ROS:  [x] All negative/unchanged except if checked.  Explain positive(checked items) below:  [] Constitutional  [] Eyes  [] Ear/Nose/Mouth/Throat  [] Respiratory  [] CV  [] GI  []   [] Musculoskeletal  [] Skin/Breast  [] Neurological  [] Endocrine  [] Heme/Lymph  [] Allergic/Immunologic    Explanation:     MEDICATIONS:    Current Facility-Administered Medications:     loperamide (IMODIUM) capsule 2 mg, 2 mg, Oral, 4x Daily PRN, Karina Scales APRN - CNP, 2 mg at 06/16/21 1124    acetaminophen (TYLENOL) tablet 650 mg, 650 mg, Oral, Q4H PRN, Tutu Sloan MD    magnesium hydroxide (MILK OF MAGNESIA) 400 MG/5ML suspension 30 mL, 30 mL, Oral, Daily PRN, Tutu Sloan MD    aluminum & magnesium hydroxide-simethicone (MAALOX) 200-200-20 MG/5ML suspension 30 mL, 30 mL, Oral, PRN, Tutu Sloan MD    haloperidol (HALDOL) tablet 3 mg, 3 mg, Oral, Q6H PRN **OR** haloperidol lactate (HALDOL) injection 3 mg, 3 mg, Intramuscular, Q6H PRN, Tutu Sloan MD    aspirin EC tablet 81 mg, 81 mg, Oral, Daily, Rogerney Dance, APRN - CNP, 81 mg at 06/16/21 0931    vitamin D (CHOLECALCIFEROL) tablet 2,000 Units, 2,000 Units, Oral, Daily, Rogerney Dance, APRN - CNP, 2,000 Units at 06/16/21 0931    gabapentin (NEURONTIN) capsule 100 mg, 100 mg, Oral, Daily, Ronney Dance, APRN - CNP, 100 mg at 06/16/21 0931    GenTeal Tears 0.1-0.2-0.3 % SOLN 1 drop, 1 drop, Both Eyes, 4x Daily, Rogerney Dance, APRN - CNP, 1 drop at 06/16/21 1320    memantine (NAMENDA) tablet 10 mg, 10 mg, Oral, BID, Rogerney Dance, APRN - CNP, 10 mg at 06/16/21 0931    mirtazapine (REMERON) tablet 30 mg, 30 mg, Oral, Nightly, Ronney Dance, APRN - CNP, 30 mg at 06/15/21 2107    ocuvite-lutein multivitamin 1 capsule, 1 capsule, Oral, Daily, Ronney Dance, APRN - CNP, 1 capsule at 06/16/21 0931    pantoprazole (PROTONIX) tablet 40 mg, 40 mg, Oral, QAM, Ronney Dance, APRN - CNP, 40 mg at 06/16/21 0932    potassium chloride (KLOR-CON M) extended release tablet 20 mEq, 20 mEq, Oral, Daily with breakfast, Ronney Dance, APRN - CNP, 20 mEq at 06/16/21 0932    albuterol (PROVENTIL) nebulizer solution 2.5 mg, 2.5 mg, Nebulization, Q4H PRN, Ronney Dance, APRN - CNP    cephALEXin (KEFLEX) capsule 500 mg, 500 mg, Oral, 2 times per day, Isidro Cedeño DO, 500 mg at 06/16/21 0931      Examination:  BP (!) 160/82 Comment: Manual  Pulse 75   Temp 97.9 °F (36.6 °C) (Temporal)   Resp 17   Ht 5' (1.524 m)   Wt 115 lb (52.2 kg)   SpO2 96%   BMI 22.46 kg/m²   Gait - steady  Medication side effects(SE): None reported    Mental Status Examination:    Level of consciousness:  within normal limits   Appearance:  fair grooming and fair hygiene  Behavior/Motor:  no abnormalities noted  Attitude toward examiner:  cooperative  Speech:  normal volume   Mood: depressed  Affect:  blunted  Thought processes: Pleasantly confused  Thought content: Presently confused  Cognition: Oriented to self  Concentration intact  Insight fair Judgement fair     ASSESSMENT:   Patient symptoms are:  [] Well controlled  [] Improving  [] Worsening  [x] No change      Diagnosis:   Principal Problem: Moderate episode of recurrent major depressive disorder (HCC)  Active Problems:    Dementia (HCC)    GERD (gastroesophageal reflux disease)    Valvular heart disease    Cardiac pacemaker in situ    CKD (chronic kidney disease) stage 3, GFR 30-59 ml/min (HCC)    Orthostatic hypotension    COPD (chronic obstructive pulmonary disease) ([facility])  Resolved Problems:    * No resolved hospital problems. *      LABS:    Recent Labs     06/14/21  1144   WBC 6.9   HGB 13.5        Recent Labs     06/14/21  1144      K 4.6      CO2 26   BUN 15   CREATININE 1.1*   GLUCOSE 110*     Recent Labs     06/14/21  1144   BILITOT 0.3   ALKPHOS 118*   AST 17   ALT 8     Lab Results   Component Value Date    LABAMPH NOT DETECTED 06/14/2021    BARBSCNU NOT DETECTED 06/14/2021    LABBENZ NOT DETECTED 06/14/2021    LABMETH NOT DETECTED 06/14/2021    OPIATESCREENURINE NOT DETECTED 06/14/2021    PHENCYCLIDINESCREENURINE NOT DETECTED 06/14/2021    ETOH <10 06/14/2021     Lab Results   Component Value Date    TSH 3.080 05/13/2020     No results found for: LITHIUM  No results found for: VALPROATE, CBMZ        Treatment Plan:  The patient's diagnosis, treatment plan, medication management were formulated after patient was seen directly by the attending physician and myself and all relevant documentation was reviewed. Reviewed current Medications with the patient. Risk, benefit, side effects, possible outcomes of the medication and alternatives discussed with the patient and the patient demonstrated understanding. The patient was also educated that the outcome of treatment will depend on the medication compliance as directed by the prescribers along with regular follow-up, compliance with the labs and other work-up, as clinically indicated.     Continue mirtazapine 30 mg nightly for depression  Continue Namenda 10 mg twice daily for cognitive function    Home medications continued as indicated  Continue Keflex 500 mg twice daily for 7 days for treatment of UTI  Patient followed by medical      collateral information: Followed by social work  CD evaluation  Encourage patient to attend group and other milieu activities. Discharge planning discussed with the patient and treatment team.    PSYCHOTHERAPY/COUNSELING:  [x] Therapeutic interview  [x] Supportive  [] CBT  [] Ongoing  [] Other    [x] Patient continues to need, on a daily basis, active treatment furnished directly by or requiring the supervision of inpatient psychiatric personnel      Anticipated Length of stay: 3 to 5 days based on stability          NOTE: This report was transcribed using voice recognition software. Every effort was made to ensure accuracy; however, inadvertent computerized transcription errors may be present.   Electronically signed by Ronney Dance, APRN - CNP on 6/16/2021 at 1:46 PM

## 2021-06-17 VITALS
HEIGHT: 60 IN | SYSTOLIC BLOOD PRESSURE: 170 MMHG | DIASTOLIC BLOOD PRESSURE: 90 MMHG | TEMPERATURE: 97.2 F | BODY MASS INDEX: 22.58 KG/M2 | RESPIRATION RATE: 16 BRPM | OXYGEN SATURATION: 94 % | HEART RATE: 85 BPM | WEIGHT: 115 LBS

## 2021-06-17 LAB — SARS-COV-2, NAAT: NOT DETECTED

## 2021-06-17 PROCEDURE — 6370000000 HC RX 637 (ALT 250 FOR IP): Performed by: EMERGENCY MEDICINE

## 2021-06-17 PROCEDURE — 99239 HOSP IP/OBS DSCHRG MGMT >30: CPT | Performed by: NURSE PRACTITIONER

## 2021-06-17 PROCEDURE — 87635 SARS-COV-2 COVID-19 AMP PRB: CPT

## 2021-06-17 PROCEDURE — 6370000000 HC RX 637 (ALT 250 FOR IP): Performed by: NURSE PRACTITIONER

## 2021-06-17 PROCEDURE — 6370000000 HC RX 637 (ALT 250 FOR IP): Performed by: PSYCHIATRY & NEUROLOGY

## 2021-06-17 RX ORDER — CEPHALEXIN 500 MG/1
500 CAPSULE ORAL EVERY 12 HOURS SCHEDULED
Qty: 10 CAPSULE | Refills: 0
Start: 2021-06-17 | End: 2021-06-22

## 2021-06-17 RX ADMIN — DEXTRAN 70, GLYCERIN, HYPROMELLOSE 1 DROP: 1; 2; 3 SOLUTION/ DROPS OPHTHALMIC at 09:25

## 2021-06-17 RX ADMIN — PANTOPRAZOLE SODIUM 40 MG: 40 TABLET, DELAYED RELEASE ORAL at 10:20

## 2021-06-17 RX ADMIN — DEXTRAN 70, GLYCERIN, HYPROMELLOSE 1 DROP: 1; 2; 3 SOLUTION/ DROPS OPHTHALMIC at 12:43

## 2021-06-17 RX ADMIN — HALOPERIDOL 3 MG: 2 TABLET ORAL at 10:20

## 2021-06-17 RX ADMIN — Medication 1 CAPSULE: at 09:25

## 2021-06-17 RX ADMIN — Medication 2000 UNITS: at 09:26

## 2021-06-17 RX ADMIN — ASPIRIN 81 MG: 81 TABLET, COATED ORAL at 09:25

## 2021-06-17 RX ADMIN — CEPHALEXIN 500 MG: 500 CAPSULE ORAL at 09:25

## 2021-06-17 RX ADMIN — GABAPENTIN 100 MG: 100 CAPSULE ORAL at 09:26

## 2021-06-17 RX ADMIN — POTASSIUM CHLORIDE 20 MEQ: 1500 TABLET, EXTENDED RELEASE ORAL at 09:26

## 2021-06-17 RX ADMIN — MEMANTINE HYDROCHLORIDE 10 MG: 10 TABLET, FILM COATED ORAL at 09:26

## 2021-06-17 NOTE — DISCHARGE SUMMARY
DISCHARGE SUMMARY      Patient ID:  Trenton Beaulieu  [id number]  33 y.o.  7/19/1925    Admit date: 6/14/2021    Discharge date and time: 6/17/2021    Admitting Physician: Justin Ellison MD     Discharge Physician: Dr Ellie King MD    Discharge Diagnoses:   Patient Active Problem List   Diagnosis    Essential hypertension    Dementia [facility])    [facility]    GERD (gastroesophageal reflux disease)    Valvular heart disease    Cardiac pacemaker in situ    CKD (chronic kidney disease) stage 3, GFR 30-59 ml/min ([facility])    Orthostatic hypotension    Gait difficulty    Seborrheic keratoses    AV block    Fatigue    Benign essential tremor    Peripheral neuropathy    Major depressive disorder, single episode, in full remission ([facility]    Congestive heart failure ([address]    Suicidal behavior without attempted self-injury    Moderate episode of recurrent major depressive disorder ([facility]    COPD (chronic obstructive pulmonary disease) ([facility]       Admission Condition: poor    Discharged Condition: stable    Admission Circumstance:   She was presented to [facility] emergency department via EMS after crying at home and endorsing increased depression with suicidal ideation her daughter called an ambulance and brought her to the emergency room for assessment.   The patient was pink slipped    PAST MEDICAL/PSYCHIATRIC HISTORY:   Past Medical History:   Diagnosis Date    Acid reflux disease     Atrial fibrillation ([facility])     h/o    AVB (atrioventricular block)     COPD (chronic obstructive pulmonary disease) ([facility])     Dementia ([facility])     Depression     Hypotension     Intracerebral hematoma ([facility] 2009    d/t fall    Nausea & vomiting     Seizure disorder ([facility] 2009    related to intracranial bleed    Syncope and collapse 5/25/2014       FAMILY/SOCIAL HISTORY:  Family History   Problem Relation Age of Onset    Cancer Brother     Cancer Other      Social History     Socioeconomic History    Marital status:      Spouse name: Not on file    Number of children: Not on file    Years of education: Not on file    Highest education level: Not on file   Occupational History    Occupation: retired     Employer: RETIRED   Tobacco Use    Smoking status: Never Smoker    Smokeless tobacco: Never Used   Substance and Sexual Activity    Alcohol use: No    Drug use: No    Sexual activity: Never   Other Topics Concern    Not on file   Social History Narrative    Not on file     Social Determinants of Health     Financial Resource Strain:     Difficulty of Paying Living Expenses:    Food Insecurity:     Worried About [address] in the Last Year:     [address] Prescription Eyewear in the Last Year:    Transportation Needs:     Lack of Transportation (Medical):      Lack of Transportation (Non-Medical):    Physical Activity:     Days of Exercise per Week:     Minutes of Exercise per Session:    Stress:     Feeling of Stress :    Social Connections:     Frequency of Communication with Friends and Family:     Frequency of Social Gatherings with Friends and Family:     Attends Hindu Services:     Active Member of Clubs or Organizations:     Attends Club or Organization Meetings:     Marital Status:    Intimate Partner Violence:     Fear of Current or Ex-Partner:     Emotionally Abused:     Physically Abused:     Sexually Abused:        MEDICATIONS:    Current Facility-Administered Medications:     loperamide (IMODIUM) capsule 2 mg, 2 mg, Oral, 4x Daily PRN, CASSIE Lorenzo - CNP, 2 mg at 06/16/21 1124    acetaminophen (TYLENOL) tablet 650 mg, 650 mg, Oral, Q4H PRN, Ky Kerr MD    magnesium hydroxide (MILK OF MAGNESIA) 400 MG/5ML suspension 30 mL, 30 mL, Oral, Daily PRN, Ky Kerr MD    aluminum & magnesium hydroxide-simethicone (MAALOX) 200-200-20 MG/5ML suspension 30 mL, 30 mL, Oral, PRN, Ky Kerr MD    haloperidol (HALDOL) tablet 3 mg, 3 mg, Oral, Q6H PRN **OR** haloperidol lactate (HALDOL) injection 3 mg, 3 mg, Intramuscular, Q6H PRN, Prabhakar Esquivel MD    aspirin EC tablet 81 mg, 81 mg, Oral, Daily, Everrett Sarah, APRN - CNP, 81 mg at 06/17/21 0881    vitamin D (CHOLECALCIFEROL) tablet 2,000 Units, 2,000 Units, Oral, Daily, Everrett Sarah, APRN - CNP, 2,000 Units at 06/17/21 7908    gabapentin (NEURONTIN) capsule 100 mg, 100 mg, Oral, Daily, Everrett Sarah, APRN - CNP, 100 mg at 06/17/21 0926    GenTeal Tears 0.1-0.2-0.3 % SOLN 1 drop, 1 drop, Both Eyes, 4x Daily, Everrett Sarah, APRN - CNP, 1 drop at 06/17/21 0925    memantine (NAMENDA) tablet 10 mg, 10 mg, Oral, BID, Everrett Sarah, APRN - CNP, 10 mg at 06/17/21 0926    mirtazapine (REMERON) tablet 30 mg, 30 mg, Oral, Nightly, Everrett Sarah, APRN - CNP, 30 mg at 06/16/21 2112    ocuvite-lutein multivitamin 1 capsule, 1 capsule, Oral, Daily, Everrett Sarah, APRN - CNP, 1 capsule at 06/17/21 0925    pantoprazole (PROTONIX) tablet 40 mg, 40 mg, Oral, QAM, Everrett Sarah, APRN - CNP, 40 mg at 06/16/21 0932    potassium chloride (KLOR-CON M) extended release tablet 20 mEq, 20 mEq, Oral, Daily with breakfast, Everscottie Smith, APRN - CNP, 20 mEq at 06/17/21 0926    albuterol (PROVENTIL) nebulizer solution 2.5 mg, 2.5 mg, Nebulization, Q4H PRN, Everscottie Smith, APRN - CNP    cephALEXin (KEFLEX) capsule 500 mg, 500 mg, Oral, 2 times per day, Brandon Griffiths DO, 500 mg at 06/17/21 8303    Examination:  BP (!) 170/90   Pulse 85   Temp 97.2 °F (36.2 °C) (Temporal)   Resp 16   Ht 5' (1.524 m)   Wt 115 lb (52.2 kg)   SpO2 94%   BMI 22.46 kg/m²   Gait - steady    HOSPITAL COURSE[de-identified]  Following admission to the hospital, patient had a complete physical exam and blood work up, which she was medically cleared and admitted to [facility] for psychiatric evaluation and stabilization. The patient was monitored closely with suicide and appropriate precautions.   She was started on Remeron 30 mg nightly for depression, she was toward examiner:  attentive and good eye contact  Speech:  spontaneous, normal rate and normal volume   Mood: euthymic  Affect:  mood congruent  Thought processes:  linear   Thought content: The patient is devoid of suicidal or homicidal ideation intent or plan. Devoid of auditory or visual hallucinations or other perceptual disturbances, there are no overt or covert signs of psychosis or paranoia. There are no neurovegetative signs of depression. Cognition:  oriented to person, place, and time   Concentration intact  Memory intact  Insight good   Judgement fair   Fund of Knowledge adequate      ASSESSMENT:  Patient symptoms are:  [x] Well controlled  [x] Improving  [] Worsening  [] No change    Reason for more than one antipsychotic:  [x] N/A  [] 3 Failed Monotherapy attempts (Drugs tried:)  [] Crossover to a new antipsychotic  [] Taper to Monotherapy from Polypharmacy  [] Augmentation of clozapine therapy due to treatment resistance to single therapy    Diagnosis:  Principal Problem: Moderate episode of recurrent major depressive disorder (HCC)  Active Problems:    Dementia (HCC)    GERD (gastroesophageal reflux disease)    Valvular heart disease    Cardiac pacemaker in situ    CKD (chronic kidney disease) stage 3, GFR 30-59 ml/min (HCC)    Orthostatic hypotension    COPD (chronic obstructive pulmonary disease) ([facility])  Resolved Problems:    * No resolved hospital problems.  *      LABS:    Recent Labs     06/14/21  1144   WBC 6.9   HGB 13.5        Recent Labs     06/14/21  1144      K 4.6      CO2 26   BUN 15   CREATININE 1.1*   GLUCOSE 110*     Recent Labs     06/14/21  1144   BILITOT 0.3   ALKPHOS 118*   AST 17   ALT 8     Lab Results   Component Value Date    LABAMPH NOT DETECTED 06/14/2021    BARBSCNU NOT DETECTED 06/14/2021    LABBENZ NOT DETECTED 06/14/2021    LABMETH NOT DETECTED 06/14/2021    OPIATESCREENURINE NOT DETECTED 06/14/2021    PHENCYCLIDINESCREENURINE NOT DETECTED MORNING     * Handicap Placard Misc  by Does not apply route Patient cannot walk 200 ft without stopping to rest.    Expiration 5 yrs     * Handicap Placard Misc  by Does not apply route Patient cannot walk 200 ft without stopping to rest.    Expiration 5 yrs     ibuprofen 200 MG tablet  Commonly known as: ADVIL;MOTRIN     loratadine 10 MG tablet  Commonly known as: CLARITIN  TAKE 1 TABLET BY MOUTH EVERY MORNING     magnesium hydroxide 400 MG/5ML suspension  Commonly known as: MILK OF MAGNESIA     Medical Compression Stockings Misc  1 each by Does not apply route daily 20-30 mmHg. Knee high. Dx Venous insufficiency     memantine ER 28 MG Cp24 extended release capsule  Commonly known as: NAMENDA XR  TAKE ONE CAPSULE BY MOUTH DAILY     mirtazapine 30 MG tablet  Commonly known as: REMERON  TAKE 1 TABLET BY MOUTH NIGHTLY     ocuvite-lutein Tabs oral tablet  Take 1 tablet by mouth daily     pantoprazole 40 MG tablet  Commonly known as: PROTONIX  TAKE 1 TABLET BY MOUTH EVERY MORNING     potassium chloride 20 MEQ extended release tablet  Commonly known as: KLOR-CON M  TAKE 1 TABLET BY MOUTH TWICE DAILY WITH MEALS     Ventolin  (90 Base) MCG/ACT inhaler  Generic drug: albuterol sulfate HFA  INHALE 2 PUFFS INTO THE LUNGS FOUR TIMES DAILY     Vitamin D3 50 MCG (2000 UT) Caps  TAKE 1 CAPSULE BY MOUTH TWICE DAILY         * This list has 2 medication(s) that are the same as other medications prescribed for you. Read the directions carefully, and ask your doctor or other care provider to review them with you. STOP taking these medications    LORazepam 1 MG tablet  Commonly known as: ATIVAN           Where to Get Your Medications      Information about where to get these medications is not yet available    Ask your nurse or doctor about these medications  · cephALEXin 500 MG capsule       NOTE: This report was transcribed using voice recognition software.  Every effort was made to ensure accuracy; however, inadvertent computerized transcription errors may be present.     TIME SPEND - 35 MINUTES TO COMPLETE THE EVALUATION, DISCHARGE SUMMARY, MEDICATION RECONCILIATION AND FOLLOW UP CARE     Signed:  CASSIE Harrell CNP  6/17/2021  10:02 AM

## 2021-06-17 NOTE — DISCHARGE INSTR - COC
hypertension I10    Dementia ([facility]) F03.90    Atrial fibrillation ([facility]) I48.91    GERD (gastroesophageal reflux disease) K21.9    Valvular heart disease I38    Cardiac pacemaker in situ Z95.0    CKD (chronic kidney disease) stage 3, GFR 30-59 ml/min ([facility]) N18.30    Orthostatic hypotension I95.1    Gait difficulty R26.9    Seborrheic keratoses L82.1    AV block I44.30    Fatigue R53.83    Benign essential tremor G25.0    Peripheral neuropathy G62.9    Major depressive disorder, single episode, in full remission ([facility]) F32.5    Congestive heart failure ([facility]) I50.9    Suicidal behavior without attempted self-injury R45.89    Moderate episode of recurrent major depressive disorder ([facility]) F33.1    COPD (chronic obstructive pulmonary disease) ([facility]) J44.9       Isolation/Infection:   Isolation          No Isolation        Patient Infection Status     Infection Onset Added Last Indicated Last Indicated By Review Planned Expiration Resolved Resolved By    None active    Resolved    COVID-19 Rule Out 06/17/21 06/17/21 06/17/21 COVID-19, Rapid (Ordered)   06/17/21 Rule-Out Test Resulted    COVID-19 Rule Out 06/14/21 06/14/21 06/14/21 COVID-19 & Influenza Combo (Ordered)   06/14/21 Rule-Out Test Resulted          Nurse Assessment:  Last Vital Signs: BP (!) 170/90   Pulse 85   Temp 97.2 °F (36.2 °C) (Temporal)   Resp 16   Ht 5' (1.524 m)   Wt 115 lb (52.2 kg)   SpO2 94%   BMI 22.46 kg/m²     Last documented pain score (0-10 scale): Pain Level: 0  Last Weight:   Wt Readings from Last 1 Encounters:   06/14/21 115 lb (52.2 kg)     Mental Status:  oriented    IV Access:  - None    Nursing Mobility/ADLs:  Walking   Independent  Transfer  Independent  Bathing  Independent  Dressing  Independent  Toileting  Independent  Feeding  Independent  Med Admin  Independent  Med Delivery   whole    Wound Care Documentation and Therapy:        Elimination:  Continence:   · Bowel:  Yes  · Bladder: Yes  Urinary Catheter: None Admission H&P: {CHP DME Changes in WXTRH:[id number]}    PHYSICIAN SIGNATURE:  {Esignature:[id number]}

## 2021-06-17 NOTE — CARE COORDINATION
In order to ensure appropriate transition and discharge planning is in place, the following documents have been transmitted to [facility], as the new outpatient provider:     · The d/c diagnosis was transmitted to the next care provider  · The reason for hospitalization was transmitted to the next care provider  · The d/c medications (dosage and indication) were transmitted to the next care provider   · The continuing care plan was transmitted to the next care provider

## 2021-06-17 NOTE — PROGRESS NOTES
Group Therapy Note  Patient attended goals group and stated daily goal as to feel less anxious. Group Therapy Note    Date: 6/17/2021  Start Time: 9:45  End Time:  10:15  Number of Participants: 9    Type of Group: Psychoeducation    Wellness Binder Information  Module Name:  Coping Skills  Session Number: NA    Patient's Goal: To id positive coping skills to use on a daily basis. Notes: Attended group and was a listener but tearful and anxious. Status After Intervention:  Improved    Participation Level:  Active Listener    Participation Quality: Attentive      Speech:  hesitant      Thought Process/Content: Linear      Affective Functioning: Flat      Mood: anxious and depressed      Level of consciousness:  Alert      Response to Learning: Progressing to goal      Endings: None Reported    Modes of Intervention: Education      Discipline Responsible: Psychoeducational Specialist      Signature:  ANAMIKA Guido

## 2021-06-17 NOTE — CARE COORDINATION
Pt accepted to [address] [place] [address] pending a negative COVID test. Nursing and case management aware of this. Sw will fax Johanna at [address] [place] [address] pt's Garret Bashir results [phone].

## 2021-06-17 NOTE — CARE COORDINATION
SW called and informed pt's daughter Marcelino Mosley to inform her that the pt was accepted by [facility] and she will be going there today and arriving around 5PM and that transportation will be provided.

## 2021-06-17 NOTE — PROGRESS NOTES
[facility]  Discharge Note    Pt discharged with followings belongings:   Dentures: Lowers, Uppers  Vision - Corrective Lenses: Glasses  Hearing Aid: Bilateral hearing aids  Jewelry: Ring, Watch  Body Piercings Removed: N/A  Clothing: Footwear, Shirt, Pants, Socks, Undergarments (Comment)  Were All Patient Medications Collected?: Not Applicable  Other Valuables: None    Patient education on aftercare instructions: yes  Information faxed to [address] [place] [address],[address] by Annette Maxwell Patient verbalize understanding of AVS:  yes.     Status EXAM upon discharge:  Status and Exam  Normal: Yes  Facial Expression: Brightened  Affect: Congruent  Level of Consciousness: Alert  Mood:Normal: No  Mood: Anxious  Motor Activity:Normal: No  Motor Activity: Decreased  Interview Behavior: Cooperative  Preception: [place] to Person, [place] to Time, [place] to Situation, [place] to Place  Attention:Normal: No  Attention: Distractible  Thought Processes: Other(See comment) (Impaired)  Thought Content:Normal: No  Thought Content: Preoccupations  Hallucinations: None  Delusions: No  Memory:Normal: No  Memory: Poor Recent  Insight and Judgment: No  Insight and Judgment: Poor Judgment  Present Suicidal Ideation: No  Present Homicidal Ideation: No      Metabolic Screening:    Lab Results   Component Value Date    LABA1C 5.0 06/15/2021       Lab Results   Component Value Date    CHOL 152 06/15/2021    CHOL 173 08/09/2016    CHOL 203 (H) 06/26/2014     Lab Results   Component Value Date    TRIG 91 06/15/2021    TRIG 214 (H) 08/09/2016    TRIG 78 07/12/2014    TRIG 106 06/26/2014     Lab Results   Component Value Date    HDL 70 06/15/2021    HDL 70 08/09/2016    HDL 81 06/26/2014     No components found for: [facility]  Lab Results   Component Value Date    LABVLDL 18 06/15/2021    LABVLDL 43 08/09/2016    LABVLDL 21 06/26/2014       Dhaval Boston RN

## 2021-06-17 NOTE — GROUP NOTE
Group Therapy Note     Date: 6/17/2021     Group Start Time: 1405  Group End Time: 1440  Group Topic: Cognitive Skills     [facility]    Krista Hunter           Group Therapy Note     Attendees: 10           Patient's Goal: Pt will to verbalize ways that they can build happiness in their lives.     Notes:  Pt participated and made connections during group. Status After Intervention:  Improved    Participation Level:  Active Listener and Interactive    Participation Quality: Appropriate, Attentive and Sharing      Speech:  normal      Thought Process/Content: Logical  Linear      Affective Functioning: Congruent      Mood: euthymic      Level of consciousness:  Alert and Oriented x4      Response to Learning: Able to verbalize current knowledge/experience and Able to verbalize/acknowledge new learning      Endings: None Reported    Modes of Intervention: Education, Support, Socialization, Exploration and Clarifying      Discipline Responsible: /Counselor      Signature:  Krista Hunter

## 2021-08-04 RX ORDER — PANTOPRAZOLE SODIUM 40 MG/1
40 TABLET, DELAYED RELEASE ORAL EVERY MORNING
Qty: 90 TABLET | Refills: 1 | Status: SHIPPED | OUTPATIENT
Start: 2021-08-04

## 2023-07-13 ENCOUNTER — TELEPHONE (OUTPATIENT)
Dept: PRIMARY CARE CLINIC | Age: 88
End: 2023-07-13

## 2023-07-13 NOTE — TELEPHONE ENCOUNTER
Daughter Citlaly Small is in [facility]  [facility] and the house  is not seeing her. She wants her to re-establish with you. Has some dementia issues and hallucinations. Will you accept her back as a patient?

## 2023-07-31 ENCOUNTER — OFFICE VISIT (OUTPATIENT)
Dept: PRIMARY CARE CLINIC | Age: 88
End: 2023-07-31
Payer: MEDICARE

## 2023-07-31 VITALS
WEIGHT: 140 LBS | HEIGHT: 60 IN | SYSTOLIC BLOOD PRESSURE: 110 MMHG | BODY MASS INDEX: 27.48 KG/M2 | TEMPERATURE: 97.8 F | DIASTOLIC BLOOD PRESSURE: 68 MMHG | OXYGEN SATURATION: 95 % | HEART RATE: 88 BPM

## 2023-07-31 DIAGNOSIS — I10 ESSENTIAL HYPERTENSION: ICD-10-CM

## 2023-07-31 DIAGNOSIS — I48.0 PAROXYSMAL ATRIAL FIBRILLATION (HCC): ICD-10-CM

## 2023-07-31 DIAGNOSIS — R41.0 CONFUSION: ICD-10-CM

## 2023-07-31 DIAGNOSIS — Z91.81 AT HIGH RISK FOR FALLS: ICD-10-CM

## 2023-07-31 DIAGNOSIS — N18.31 STAGE 3A CHRONIC KIDNEY DISEASE (HCC): Chronic | ICD-10-CM

## 2023-07-31 DIAGNOSIS — Z13.31 POSITIVE DEPRESSION SCREENING: ICD-10-CM

## 2023-07-31 DIAGNOSIS — R26.9 GAIT DIFFICULTY: ICD-10-CM

## 2023-07-31 DIAGNOSIS — F03.C18 SEVERE DEMENTIA WITH OTHER BEHAVIORAL DISTURBANCE, UNSPECIFIED DEMENTIA TYPE (HCC): Primary | Chronic | ICD-10-CM

## 2023-07-31 DIAGNOSIS — I38 VALVULAR HEART DISEASE: Chronic | ICD-10-CM

## 2023-07-31 DIAGNOSIS — K21.9 GASTROESOPHAGEAL REFLUX DISEASE WITHOUT ESOPHAGITIS: Chronic | ICD-10-CM

## 2023-07-31 LAB
ALBUMIN SERPL-MCNC: 4 G/DL (ref 3.5–5.2)
ALP BLD-CCNC: 116 U/L (ref 35–104)
ALT SERPL-CCNC: 7 U/L (ref 0–32)
ANION GAP SERPL CALCULATED.3IONS-SCNC: 13 MMOL/L (ref 7–16)
AST SERPL-CCNC: 19 U/L (ref 0–31)
BACTERIA: ABNORMAL
BILIRUB SERPL-MCNC: 0.3 MG/DL (ref 0–1.2)
BILIRUBIN URINE: NEGATIVE
BUN BLDV-MCNC: 22 MG/DL (ref 6–23)
CALCIUM SERPL-MCNC: 9.4 MG/DL (ref 8.6–10.2)
CHLORIDE BLD-SCNC: 104 MMOL/L (ref 98–107)
CO2: 25 MMOL/L (ref 22–29)
COLOR: YELLOW
CREAT SERPL-MCNC: 1.1 MG/DL (ref 0.5–1)
EPITHELIAL CELLS UA: ABNORMAL /HPF
GFR SERPL CREATININE-BSD FRML MDRD: 46 ML/MIN/1.73M2
GLUCOSE BLD-MCNC: 110 MG/DL (ref 74–99)
GLUCOSE URINE: NEGATIVE MG/DL
HCT VFR BLD CALC: 40.7 % (ref 34–48)
HEMOGLOBIN: 12.4 G/DL (ref 11.5–15.5)
KETONES, URINE: NEGATIVE MG/DL
LEUKOCYTE ESTERASE, URINE: ABNORMAL
MCH RBC QN AUTO: 32.2 PG (ref 26–35)
MCHC RBC AUTO-ENTMCNC: 30.5 G/DL (ref 32–34.5)
MCV RBC AUTO: 105.7 FL (ref 80–99.9)
NITRITE, URINE: NEGATIVE
PDW BLD-RTO: 13.6 % (ref 11.5–15)
PH UA: 5.5 (ref 5–9)
PLATELET # BLD: 164 K/UL (ref 130–450)
PMV BLD AUTO: 10.2 FL (ref 7–12)
POTASSIUM SERPL-SCNC: 4.5 MMOL/L (ref 3.5–5)
PROTEIN UA: NEGATIVE MG/DL
RBC # BLD: 3.85 M/UL (ref 3.5–5.5)
RBC UA: ABNORMAL /HPF
SODIUM BLD-SCNC: 142 MMOL/L (ref 132–146)
SPECIFIC GRAVITY UA: 1.02 (ref 1–1.03)
TOTAL PROTEIN: 7.1 G/DL (ref 6.4–8.3)
TSH SERPL DL<=0.05 MIU/L-ACNC: 5.88 UIU/ML (ref 0.27–4.2)
TURBIDITY: CLEAR
URINE HGB: NEGATIVE
UROBILINOGEN, URINE: 0.2 EU/DL (ref 0–1)
WBC # BLD: 7 K/UL (ref 4.5–11.5)
WBC UA: ABNORMAL /HPF

## 2023-07-31 PROCEDURE — 99214 OFFICE O/P EST MOD 30 MIN: CPT | Performed by: FAMILY MEDICINE

## 2023-07-31 PROCEDURE — 1036F TOBACCO NON-USER: CPT | Performed by: FAMILY MEDICINE

## 2023-07-31 PROCEDURE — G8419 CALC BMI OUT NRM PARAM NOF/U: HCPCS | Performed by: FAMILY MEDICINE

## 2023-07-31 PROCEDURE — 1123F ACP DISCUSS/DSCN MKR DOCD: CPT | Performed by: FAMILY MEDICINE

## 2023-07-31 PROCEDURE — 1090F PRES/ABSN URINE INCON ASSESS: CPT | Performed by: FAMILY MEDICINE

## 2023-07-31 PROCEDURE — G8427 DOCREV CUR MEDS BY ELIG CLIN: HCPCS | Performed by: FAMILY MEDICINE

## 2023-07-31 RX ORDER — DEXTRAN 70, GLYCERIN, HYPROMELLOSE 1; 2; 3 MG/ML; MG/ML; MG/ML
SOLUTION/ DROPS OPHTHALMIC
COMMUNITY
Start: 2023-07-04

## 2023-07-31 RX ORDER — MIRTAZAPINE 7.5 MG/1
TABLET, FILM COATED ORAL
COMMUNITY
Start: 2023-07-11 | End: 2023-07-31

## 2023-07-31 RX ORDER — DONEPEZIL HYDROCHLORIDE 5 MG/1
5 TABLET, FILM COATED ORAL NIGHTLY
Qty: 30 TABLET | Refills: 5 | Status: SHIPPED | OUTPATIENT
Start: 2023-07-31

## 2023-07-31 RX ORDER — MEMANTINE HYDROCHLORIDE 10 MG/1
10 TABLET ORAL 2 TIMES DAILY
COMMUNITY
Start: 2023-07-11

## 2023-07-31 RX ORDER — DULOXETIN HYDROCHLORIDE 60 MG/1
CAPSULE, DELAYED RELEASE ORAL
COMMUNITY
Start: 2023-07-11

## 2023-07-31 RX ORDER — LORAZEPAM 1 MG/1
1 TABLET ORAL NIGHTLY
COMMUNITY
Start: 2023-07-27

## 2023-07-31 ASSESSMENT — PATIENT HEALTH QUESTIONNAIRE - PHQ9
1. LITTLE INTEREST OR PLEASURE IN DOING THINGS: 3
SUM OF ALL RESPONSES TO PHQ9 QUESTIONS 1 & 2: 6
10. IF YOU CHECKED OFF ANY PROBLEMS, HOW DIFFICULT HAVE THESE PROBLEMS MADE IT FOR YOU TO DO YOUR WORK, TAKE CARE OF THINGS AT HOME, OR GET ALONG WITH OTHER PEOPLE: 1
6. FEELING BAD ABOUT YOURSELF - OR THAT YOU ARE A FAILURE OR HAVE LET YOURSELF OR YOUR FAMILY DOWN: 0
3. TROUBLE FALLING OR STAYING ASLEEP: 1
SUM OF ALL RESPONSES TO PHQ QUESTIONS 1-9: 12
2. FEELING DOWN, DEPRESSED OR HOPELESS: 3
4. FEELING TIRED OR HAVING LITTLE ENERGY: 1
5. POOR APPETITE OR OVEREATING: 3
7. TROUBLE CONCENTRATING ON THINGS, SUCH AS READING THE NEWSPAPER OR WATCHING TELEVISION: 1
9. THOUGHTS THAT YOU WOULD BE BETTER OFF DEAD, OR OF HURTING YOURSELF: 0
SUM OF ALL RESPONSES TO PHQ QUESTIONS 1-9: 12
SUM OF ALL RESPONSES TO PHQ QUESTIONS 1-9: 12
8. MOVING OR SPEAKING SO SLOWLY THAT OTHER PEOPLE COULD HAVE NOTICED. OR THE OPPOSITE, BEING SO FIGETY OR RESTLESS THAT YOU HAVE BEEN MOVING AROUND A LOT MORE THAN USUAL: 0
SUM OF ALL RESPONSES TO PHQ QUESTIONS 1-9: 12

## 2023-07-31 ASSESSMENT — ENCOUNTER SYMPTOMS
NAUSEA: 0
WHEEZING: 0
SHORTNESS OF BREATH: 0
COUGH: 0
EYE ITCHING: 0
SINUS PRESSURE: 0
APNEA: 0
SORE THROAT: 0
EYE PAIN: 0
ABDOMINAL PAIN: 0
RHINORRHEA: 0
DIARRHEA: 0
CONSTIPATION: 0
BACK PAIN: 0
COLOR CHANGE: 0
EYE REDNESS: 0
BLOOD IN STOOL: 0
CHEST TIGHTNESS: 0
VOMITING: 0

## 2023-07-31 NOTE — PROGRESS NOTES
Chief Complaint:     Chief Complaint   Patient presents with    New Patient    Hallucinations     Started 2-2.5 weeks ago, having hallucinations all day everyday. Patient denies any pain. Mental Health Problem    Fatigue    Heart Problem    Altered Mental Status    Hypertension         Mental Health Problem  The primary symptoms include hallucinations. The primary symptoms do not include dysphoric mood. The current episode started more than 1 month ago. The hallucinations began more than 1 month ago. The hallucinations appear to have been unchanged since their onset. She has visual hallucinations. The onset of the illness is precipitated by a stressful event and emotional stress. The degree of incapacity that she is experiencing as a consequence of her illness is mild. Additional symptoms of the illness include fatigue. Additional symptoms of the illness do not include anhedonia, insomnia, hypersomnia, appetite change, unexpected weight change, headaches or abdominal pain. She does not admit to suicidal ideas. She does not contemplate harming herself. She has not already injured self. She does not contemplate injuring another person. Fatigue  This is a chronic problem. The current episode started more than 1 month ago. The problem occurs intermittently. The problem has been waxing and waning. Associated symptoms include fatigue and weakness. Pertinent negatives include no abdominal pain, arthralgias, chest pain, chills, congestion, coughing, diaphoresis, fever, headaches, joint swelling, myalgias, nausea, neck pain, numbness, rash, sore throat or vomiting. Nothing aggravates the symptoms. She has tried nothing for the symptoms. The treatment provided no relief. Heart Problem  This is a chronic problem. The current episode started more than 1 year ago. The problem occurs daily. The problem has been unchanged. Associated symptoms include fatigue and weakness.  Pertinent negatives include no abdominal pain,

## 2023-08-01 LAB
BASOPHILS ABSOLUTE: NORMAL
BASOPHILS RELATIVE PERCENT: NORMAL
EOSINOPHILS ABSOLUTE: NORMAL
EOSINOPHILS RELATIVE PERCENT: NORMAL
HCT VFR BLD CALC: 36.7 % (ref 36–46)
HEMOGLOBIN: 12.1 G/DL (ref 12–16)
LYMPHOCYTES ABSOLUTE: NORMAL
LYMPHOCYTES RELATIVE PERCENT: NORMAL
MCH RBC QN AUTO: 33.2 PG
MCHC RBC AUTO-ENTMCNC: 32.9 G/DL
MCV RBC AUTO: 100.9 FL
MONOCYTES ABSOLUTE: NORMAL
MONOCYTES RELATIVE PERCENT: NORMAL
NEUTROPHILS ABSOLUTE: NORMAL
NEUTROPHILS RELATIVE PERCENT: NORMAL
PLATELET # BLD: 155 K/ΜL
PMV BLD AUTO: 8.3 FL
RBC # BLD: 3.64 10^6/ΜL
WBC # BLD: 5.9 10^3/ML

## 2023-08-01 RX ORDER — LEVOTHYROXINE SODIUM 0.03 MG/1
25 TABLET ORAL DAILY
Qty: 90 TABLET | Refills: 1 | Status: SHIPPED | OUTPATIENT
Start: 2023-08-01

## 2023-08-03 ENCOUNTER — TELEPHONE (OUTPATIENT)
Dept: PRIMARY CARE CLINIC | Age: 88
End: 2023-08-03

## 2023-08-03 LAB
CULTURE: ABNORMAL
SPECIMEN DESCRIPTION: ABNORMAL

## 2023-08-03 RX ORDER — SULFAMETHOXAZOLE AND TRIMETHOPRIM 800; 160 MG/1; MG/1
1 TABLET ORAL DAILY
Qty: 5 TABLET | Refills: 0 | Status: SHIPPED | OUTPATIENT
Start: 2023-08-03 | End: 2023-08-08

## 2023-08-03 NOTE — TELEPHONE ENCOUNTER
Sai calling stating laith needs a letter stating you saw her and did a urine sample and you called in medication for her to take.  Fax # [phone]

## 2023-08-04 LAB
ALBUMIN SERPL-MCNC: 3.6 G/DL
ALP BLD-CCNC: 93 U/L
ALT SERPL-CCNC: 9 U/L
ANION GAP SERPL CALCULATED.3IONS-SCNC: NORMAL MMOL/L
AST SERPL-CCNC: 15 U/L
BILIRUB SERPL-MCNC: 0.5 MG/DL (ref 0.1–1.4)
BUN BLDV-MCNC: 20 MG/DL
CALCIUM SERPL-MCNC: 9.1 MG/DL
CHLORIDE BLD-SCNC: 106 MMOL/L
CO2: 27 MMOL/L
CREAT SERPL-MCNC: 1.2 MG/DL
EGFR: NORMAL
GLUCOSE BLD-MCNC: 87 MG/DL
POTASSIUM SERPL-SCNC: 4.3 MMOL/L
SODIUM BLD-SCNC: 140 MMOL/L
TOTAL PROTEIN: 6.2

## 2023-08-08 ENCOUNTER — TELEPHONE (OUTPATIENT)
Dept: PRIMARY CARE CLINIC | Age: 88
End: 2023-08-08

## 2023-08-08 DIAGNOSIS — N39.0 URINARY TRACT INFECTION WITHOUT HEMATURIA, SITE UNSPECIFIED: Primary | ICD-10-CM

## 2023-08-08 LAB
BILIRUBIN, URINE: NEGATIVE
BLOOD, URINE: NEGATIVE
CLARITY: CLEAR
COLOR: YELLOW
GLUCOSE URINE: NEGATIVE
KETONES, URINE: NEGATIVE
LEUKOCYTE ESTERASE, URINE: NORMAL
NITRITE, URINE: NEGATIVE
PH UA: 5 (ref 4.5–8)
PROTEIN UA: NEGATIVE
SPECIFIC GRAVITY, URINE: 1.01
UROBILINOGEN, URINE: NORMAL

## 2023-08-08 NOTE — TELEPHONE ENCOUNTER
Dinora Montes a nurse from [facility] where the pt resides is calling because the pt's daughter is concerned because the pt is still having hallucinations, she just finished the bactrim so he wasn't sure if you wanted to order another UA to see if she still has a uti, Dinora Montes is asking if the order can be faxed over to them at [phone] and if he can be called to let him know it's coming

## 2023-10-02 ENCOUNTER — OFFICE VISIT (OUTPATIENT)
Dept: PRIMARY CARE CLINIC | Age: 88
End: 2023-10-02
Payer: MEDICARE

## 2023-10-02 VITALS
HEART RATE: 94 BPM | OXYGEN SATURATION: 91 % | HEIGHT: 60 IN | DIASTOLIC BLOOD PRESSURE: 66 MMHG | SYSTOLIC BLOOD PRESSURE: 124 MMHG | WEIGHT: 140 LBS | RESPIRATION RATE: 16 BRPM | BODY MASS INDEX: 27.48 KG/M2

## 2023-10-02 DIAGNOSIS — R53.83 FATIGUE, UNSPECIFIED TYPE: ICD-10-CM

## 2023-10-02 DIAGNOSIS — F03.A0 MILD DEMENTIA WITHOUT BEHAVIORAL DISTURBANCE, PSYCHOTIC DISTURBANCE, MOOD DISTURBANCE, OR ANXIETY, UNSPECIFIED DEMENTIA TYPE (HCC): Chronic | ICD-10-CM

## 2023-10-02 DIAGNOSIS — Z00.00 MEDICARE ANNUAL WELLNESS VISIT, SUBSEQUENT: Primary | ICD-10-CM

## 2023-10-02 DIAGNOSIS — R26.9 GAIT DIFFICULTY: ICD-10-CM

## 2023-10-02 DIAGNOSIS — I10 ESSENTIAL HYPERTENSION: ICD-10-CM

## 2023-10-02 DIAGNOSIS — G25.0 BENIGN ESSENTIAL TREMOR: ICD-10-CM

## 2023-10-02 DIAGNOSIS — N18.31 STAGE 3A CHRONIC KIDNEY DISEASE (HCC): ICD-10-CM

## 2023-10-02 DIAGNOSIS — J44.9 CHRONIC OBSTRUCTIVE PULMONARY DISEASE, UNSPECIFIED COPD TYPE (HCC): ICD-10-CM

## 2023-10-02 DIAGNOSIS — I48.0 PAROXYSMAL ATRIAL FIBRILLATION (HCC): ICD-10-CM

## 2023-10-02 DIAGNOSIS — I50.9 CHRONIC CONGESTIVE HEART FAILURE, UNSPECIFIED HEART FAILURE TYPE (HCC): ICD-10-CM

## 2023-10-02 DIAGNOSIS — F33.1 MAJOR DEPRESSIVE DISORDER, RECURRENT, MODERATE (HCC): ICD-10-CM

## 2023-10-02 PROBLEM — F32.5 MAJOR DEPRESSIVE DISORDER, SINGLE EPISODE, IN FULL REMISSION (HCC): Status: RESOLVED | Noted: 2020-08-31 | Resolved: 2023-10-02

## 2023-10-02 PROBLEM — R45.89 SUICIDAL BEHAVIOR WITHOUT ATTEMPTED SELF-INJURY: Status: RESOLVED | Noted: 2021-06-14 | Resolved: 2023-10-02

## 2023-10-02 PROCEDURE — G8484 FLU IMMUNIZE NO ADMIN: HCPCS | Performed by: FAMILY MEDICINE

## 2023-10-02 PROCEDURE — G0439 PPPS, SUBSEQ VISIT: HCPCS | Performed by: FAMILY MEDICINE

## 2023-10-02 PROCEDURE — 1123F ACP DISCUSS/DSCN MKR DOCD: CPT | Performed by: FAMILY MEDICINE

## 2023-10-02 ASSESSMENT — ENCOUNTER SYMPTOMS
ABDOMINAL PAIN: 0
NAUSEA: 0
SHORTNESS OF BREATH: 0
COLOR CHANGE: 0
DIARRHEA: 0
BACK PAIN: 0
SINUS PRESSURE: 0
BLOOD IN STOOL: 0
RHINORRHEA: 0
EYE REDNESS: 0
CHEST TIGHTNESS: 0
VOMITING: 0
EYE ITCHING: 0
WHEEZING: 0
CONSTIPATION: 0
SORE THROAT: 0
EYE PAIN: 0
APNEA: 0
COUGH: 0

## 2023-10-02 ASSESSMENT — LIFESTYLE VARIABLES
HOW MANY STANDARD DRINKS CONTAINING ALCOHOL DO YOU HAVE ON A TYPICAL DAY: PATIENT DOES NOT DRINK
HOW OFTEN DO YOU HAVE A DRINK CONTAINING ALCOHOL: NEVER

## 2023-10-02 ASSESSMENT — PATIENT HEALTH QUESTIONNAIRE - PHQ9
SUM OF ALL RESPONSES TO PHQ9 QUESTIONS 1 & 2: 4
2. FEELING DOWN, DEPRESSED OR HOPELESS: 2
10. IF YOU CHECKED OFF ANY PROBLEMS, HOW DIFFICULT HAVE THESE PROBLEMS MADE IT FOR YOU TO DO YOUR WORK, TAKE CARE OF THINGS AT HOME, OR GET ALONG WITH OTHER PEOPLE: 0
9. THOUGHTS THAT YOU WOULD BE BETTER OFF DEAD, OR OF HURTING YOURSELF: 0
SUM OF ALL RESPONSES TO PHQ QUESTIONS 1-9: 4
4. FEELING TIRED OR HAVING LITTLE ENERGY: 0
1. LITTLE INTEREST OR PLEASURE IN DOING THINGS: 2
8. MOVING OR SPEAKING SO SLOWLY THAT OTHER PEOPLE COULD HAVE NOTICED. OR THE OPPOSITE, BEING SO FIGETY OR RESTLESS THAT YOU HAVE BEEN MOVING AROUND A LOT MORE THAN USUAL: 0
5. POOR APPETITE OR OVEREATING: 0
SUM OF ALL RESPONSES TO PHQ QUESTIONS 1-9: 4
6. FEELING BAD ABOUT YOURSELF - OR THAT YOU ARE A FAILURE OR HAVE LET YOURSELF OR YOUR FAMILY DOWN: 0
7. TROUBLE CONCENTRATING ON THINGS, SUCH AS READING THE NEWSPAPER OR WATCHING TELEVISION: 0
SUM OF ALL RESPONSES TO PHQ QUESTIONS 1-9: 4
SUM OF ALL RESPONSES TO PHQ QUESTIONS 1-9: 4
3. TROUBLE FALLING OR STAYING ASLEEP: 0

## 2023-10-02 NOTE — PROGRESS NOTES
Chief Complaint:     Chief Complaint   Patient presents with    Medicare AW    Hypertension    Fatigue    Mental Health Problem    COPD    Heart Problem         Mental Health Problem  The primary symptoms include hallucinations. The primary symptoms do not include dysphoric mood. The current episode started more than 1 month ago. The hallucinations began more than 1 month ago. The hallucinations appear to have been unchanged since their onset. She has visual hallucinations. The onset of the illness is precipitated by a stressful event and emotional stress. The degree of incapacity that she is experiencing as a consequence of her illness is mild. Additional symptoms of the illness include fatigue. Additional symptoms of the illness do not include anhedonia, insomnia, hypersomnia, appetite change, unexpected weight change, headaches or abdominal pain. She does not admit to suicidal ideas. She does not contemplate harming herself. She has not already injured self. She does not contemplate injuring another person. Fatigue  This is a chronic problem. The current episode started more than 1 month ago. The problem occurs intermittently. The problem has been waxing and waning. Associated symptoms include fatigue and weakness. Pertinent negatives include no abdominal pain, arthralgias, chest pain, chills, congestion, coughing, diaphoresis, fever, headaches, joint swelling, myalgias, nausea, neck pain, numbness, rash, sore throat or vomiting. Nothing aggravates the symptoms. She has tried nothing for the symptoms. The treatment provided no relief. Heart Problem  This is a chronic problem. The current episode started more than 1 year ago. The problem occurs daily. The problem has been unchanged. Associated symptoms include fatigue and weakness.  Pertinent negatives include no abdominal pain, arthralgias, chest pain, chills, congestion, coughing, diaphoresis, fever, headaches, joint swelling, myalgias, nausea, neck
supple and non-tender without mass, no thyromegaly or thyroid nodules, no cervical lymphadenopathy  Pulmonary/Chest: clear to auscultation bilaterally- no wheezes, rales or rhonchi, normal air movement, no respiratory distress  Cardiovascular: normal rate, regular rhythm, normal S1 and S2, no murmurs, rubs, clicks, or gallops, distal pulses intact, no carotid bruits  Abdomen: soft, non-tender, non-distended, normal bowel sounds, no masses or organomegaly  Extremities: no cyanosis, clubbing or edema  Musculoskeletal: normal range of motion, no joint swelling, deformity or tenderness  Neurologic: reflexes normal and symmetric, no cranial nerve deficit, gait, coordination and speech normal       No Known Allergies  Prior to Visit Medications    Medication Sig Taking? Authorizing Provider   levothyroxine (SYNTHROID) 25 MCG tablet Take 1 tablet by mouth daily Yes Wayne Hagen, DO   LORazepam (ATIVAN) 1 MG tablet Take 1 tablet by mouth nightly.  Yes Historical Provider, MD   memantine (NAMENDA) 10 MG tablet Take 1 tablet by mouth 2 times daily Yes Historical Provider, MD   DULoxetine (CYMBALTA) 60 MG extended release capsule TAKE 1 CAPSULE BY MOUTH DAILY IN THE MORNING**DO NOT CRUSH OR OPEN CAPSULE** Yes Historical Provider, MD   Artificial Tear Solution (GENTEAL TEARS) 0.1-0.2-0.3 % SOLN  Yes Historical Provider, MD   donepezil (ARICEPT) 5 MG tablet Take 1 tablet by mouth nightly Yes María Andrade, DO   pantoprazole (PROTONIX) 40 MG tablet TAKE 1 TABLET BY MOUTH EVERY MORNING Yes Hazel Davenport, DO   potassium chloride (KLOR-CON M) 20 MEQ extended release tablet TAKE 1 TABLET BY MOUTH TWICE DAILY WITH MEALS Yes Wayne Hagen, DO   Cholecalciferol (VITAMIN D3) 50 MCG (2000 UT) CAPS TAKE 1 CAPSULE BY MOUTH TWICE DAILY Yes Wayne Hagen, DO   mirtazapine (REMERON) 30 MG tablet TAKE 1 TABLET BY MOUTH NIGHTLY Yes Wayne Hagen, DO   loratadine (CLARITIN) 10 MG tablet TAKE 1 TABLET BY MOUTH EVERY MORNING Yes Wayne GARCIA

## 2023-12-27 ENCOUNTER — TELEPHONE (OUTPATIENT)
Dept: PRIMARY CARE CLINIC | Age: 88
End: 2023-12-27

## 2023-12-27 RX ORDER — BENZONATATE 100 MG/1
100 CAPSULE ORAL 3 TIMES DAILY PRN
Qty: 30 CAPSULE | Refills: 0 | Status: SHIPPED
Start: 2023-12-27 | End: 2023-12-27

## 2023-12-27 RX ORDER — PREDNISONE 20 MG/1
20 TABLET ORAL 2 TIMES DAILY
Qty: 10 TABLET | Refills: 0 | Status: SHIPPED
Start: 2023-12-27 | End: 2023-12-27

## 2023-12-27 NOTE — TELEPHONE ENCOUNTER
I will call in a steroid and some Tessalon pearls. I do not recommend Paxlovid for her at this time.

## 2023-12-27 NOTE — TELEPHONE ENCOUNTER
Then it will have to wait until tomorrow since I am out of the office. Can you just call them in to the pharmacy?

## 2023-12-28 RX ORDER — PREDNISONE 20 MG/1
20 TABLET ORAL 2 TIMES DAILY
Qty: 10 TABLET | Refills: 0 | Status: SHIPPED | OUTPATIENT
Start: 2023-12-28 | End: 2024-01-02

## 2023-12-28 RX ORDER — BENZONATATE 100 MG/1
100 CAPSULE ORAL 3 TIMES DAILY PRN
Qty: 30 CAPSULE | Refills: 0 | Status: SHIPPED | OUTPATIENT
Start: 2023-12-28 | End: 2024-01-07

## 2024-01-04 ENCOUNTER — TELEPHONE (OUTPATIENT)
Dept: PRIMARY CARE CLINIC | Age: 89
End: 2024-01-04

## 2024-01-04 DIAGNOSIS — F41.9 ANXIETY: Primary | ICD-10-CM

## 2024-01-04 RX ORDER — LORAZEPAM 1 MG/1
TABLET ORAL
Qty: 60 TABLET | Refills: 0 | Status: SHIPPED | OUTPATIENT
Start: 2024-01-04 | End: 2024-02-05

## 2024-01-04 NOTE — TELEPHONE ENCOUNTER
Facility calling asking if patients ativan order could be adjusted. She currently receives ativan 1mg at . Wanting to have a PRN order for during the day or a straight order depending on your thoughts. She has been more anxious. Daughter is in [place] currently and patient has covid (she is having a hard time being in isolation). Will need updated script [facility]

## 2024-02-01 RX ORDER — HYDROXYZINE HYDROCHLORIDE 25 MG/1
25 TABLET, FILM COATED ORAL NIGHTLY PRN
Qty: 30 TABLET | Refills: 0 | Status: SHIPPED | OUTPATIENT
Start: 2024-02-01 | End: 2024-03-02

## 2024-03-04 ENCOUNTER — OFFICE VISIT (OUTPATIENT)
Dept: PRIMARY CARE CLINIC | Age: 89
End: 2024-03-04
Payer: MEDICARE

## 2024-03-04 VITALS
HEART RATE: 89 BPM | BODY MASS INDEX: 27.48 KG/M2 | SYSTOLIC BLOOD PRESSURE: 140 MMHG | RESPIRATION RATE: 19 BRPM | DIASTOLIC BLOOD PRESSURE: 76 MMHG | TEMPERATURE: 97.3 F | OXYGEN SATURATION: 93 % | WEIGHT: 140 LBS | HEIGHT: 60 IN

## 2024-03-04 DIAGNOSIS — G25.0 BENIGN ESSENTIAL TREMOR: ICD-10-CM

## 2024-03-04 DIAGNOSIS — I10 ESSENTIAL HYPERTENSION: ICD-10-CM

## 2024-03-04 DIAGNOSIS — F03.C18 SEVERE DEMENTIA WITH OTHER BEHAVIORAL DISTURBANCE, UNSPECIFIED DEMENTIA TYPE (HCC): ICD-10-CM

## 2024-03-04 DIAGNOSIS — I50.9 CHRONIC CONGESTIVE HEART FAILURE, UNSPECIFIED HEART FAILURE TYPE (HCC): ICD-10-CM

## 2024-03-04 DIAGNOSIS — K21.9 GASTROESOPHAGEAL REFLUX DISEASE WITHOUT ESOPHAGITIS: Chronic | ICD-10-CM

## 2024-03-04 DIAGNOSIS — F41.9 ANXIETY: ICD-10-CM

## 2024-03-04 DIAGNOSIS — N18.31 STAGE 3A CHRONIC KIDNEY DISEASE (HCC): ICD-10-CM

## 2024-03-04 DIAGNOSIS — I48.0 PAROXYSMAL ATRIAL FIBRILLATION (HCC): Primary | ICD-10-CM

## 2024-03-04 DIAGNOSIS — R53.83 FATIGUE, UNSPECIFIED TYPE: ICD-10-CM

## 2024-03-04 DIAGNOSIS — J44.9 CHRONIC OBSTRUCTIVE PULMONARY DISEASE, UNSPECIFIED COPD TYPE (HCC): ICD-10-CM

## 2024-03-04 PROCEDURE — 3023F SPIROM DOC REV: CPT | Performed by: FAMILY MEDICINE

## 2024-03-04 PROCEDURE — G8484 FLU IMMUNIZE NO ADMIN: HCPCS | Performed by: FAMILY MEDICINE

## 2024-03-04 PROCEDURE — 1123F ACP DISCUSS/DSCN MKR DOCD: CPT | Performed by: FAMILY MEDICINE

## 2024-03-04 PROCEDURE — 99214 OFFICE O/P EST MOD 30 MIN: CPT | Performed by: FAMILY MEDICINE

## 2024-03-04 PROCEDURE — 1036F TOBACCO NON-USER: CPT | Performed by: FAMILY MEDICINE

## 2024-03-04 PROCEDURE — 1090F PRES/ABSN URINE INCON ASSESS: CPT | Performed by: FAMILY MEDICINE

## 2024-03-04 PROCEDURE — G8427 DOCREV CUR MEDS BY ELIG CLIN: HCPCS | Performed by: FAMILY MEDICINE

## 2024-03-04 PROCEDURE — G8419 CALC BMI OUT NRM PARAM NOF/U: HCPCS | Performed by: FAMILY MEDICINE

## 2024-03-04 RX ORDER — LORAZEPAM 0.5 MG/1
0.5 TABLET ORAL 2 TIMES DAILY PRN
Qty: 60 TABLET | Refills: 0
Start: 2024-03-04 | End: 2024-03-05

## 2024-03-04 RX ORDER — MIRTAZAPINE 7.5 MG/1
TABLET, FILM COATED ORAL
COMMUNITY
Start: 2024-02-11

## 2024-03-04 RX ORDER — LORAZEPAM 1 MG/1
TABLET ORAL
COMMUNITY
Start: 2024-02-22 | End: 2024-03-04 | Stop reason: SDUPTHER

## 2024-03-04 RX ORDER — CHOLECALCIFEROL (VITAMIN D3) 125 MCG
CAPSULE ORAL
COMMUNITY
Start: 2024-02-11

## 2024-03-04 RX ORDER — HYDROXYZINE PAMOATE 50 MG/1
CAPSULE ORAL
COMMUNITY
Start: 2024-02-19

## 2024-03-04 ASSESSMENT — PATIENT HEALTH QUESTIONNAIRE - PHQ9
3. TROUBLE FALLING OR STAYING ASLEEP: 0
SUM OF ALL RESPONSES TO PHQ QUESTIONS 1-9: 9
4. FEELING TIRED OR HAVING LITTLE ENERGY: 0
SUM OF ALL RESPONSES TO PHQ QUESTIONS 1-9: 9
10. IF YOU CHECKED OFF ANY PROBLEMS, HOW DIFFICULT HAVE THESE PROBLEMS MADE IT FOR YOU TO DO YOUR WORK, TAKE CARE OF THINGS AT HOME, OR GET ALONG WITH OTHER PEOPLE: 0
2. FEELING DOWN, DEPRESSED OR HOPELESS: 3
SUM OF ALL RESPONSES TO PHQ QUESTIONS 1-9: 9
7. TROUBLE CONCENTRATING ON THINGS, SUCH AS READING THE NEWSPAPER OR WATCHING TELEVISION: 0
SUM OF ALL RESPONSES TO PHQ9 QUESTIONS 1 & 2: 4
6. FEELING BAD ABOUT YOURSELF - OR THAT YOU ARE A FAILURE OR HAVE LET YOURSELF OR YOUR FAMILY DOWN: 1
5. POOR APPETITE OR OVEREATING: 3
8. MOVING OR SPEAKING SO SLOWLY THAT OTHER PEOPLE COULD HAVE NOTICED. OR THE OPPOSITE, BEING SO FIGETY OR RESTLESS THAT YOU HAVE BEEN MOVING AROUND A LOT MORE THAN USUAL: 1
9. THOUGHTS THAT YOU WOULD BE BETTER OFF DEAD, OR OF HURTING YOURSELF: 0
1. LITTLE INTEREST OR PLEASURE IN DOING THINGS: 1
SUM OF ALL RESPONSES TO PHQ QUESTIONS 1-9: 9

## 2024-03-04 ASSESSMENT — ENCOUNTER SYMPTOMS
CONSTIPATION: 0
EYE REDNESS: 0
WHEEZING: 0
COUGH: 0
ABDOMINAL PAIN: 0
COLOR CHANGE: 0
NAUSEA: 0
SHORTNESS OF BREATH: 0
RHINORRHEA: 0
SORE THROAT: 0
SINUS PRESSURE: 0
CHEST TIGHTNESS: 0
VOMITING: 0
BLOOD IN STOOL: 0
EYE ITCHING: 0
APNEA: 0
BACK PAIN: 0
DIARRHEA: 0
EYE PAIN: 0

## 2024-03-04 NOTE — PROGRESS NOTES
Chief Complaint:     Chief Complaint   Patient presents with    Follow-up    Hypertension    Heart Problem    Mental Health Problem    Fatigue         Hypertension  This is a chronic problem. The current episode started more than 1 year ago. The problem has been waxing and waning since onset. Associated symptoms include malaise/fatigue. Pertinent negatives include no chest pain, headaches, neck pain, palpitations, PND, shortness of breath or sweats. There are no associated agents to hypertension. There are no known risk factors for coronary artery disease. The current treatment provides significant improvement. There are no compliance problems.  There is no history of CAD/MI, CVA or PVD. Identifiable causes of hypertension include a thyroid problem. There is no history of a hypertension causing med, pheochromocytoma, renovascular disease or sleep apnea.   Heart Problem  This is a chronic problem. The current episode started more than 1 year ago. The problem occurs daily. The problem has been unchanged. Associated symptoms include fatigue and weakness. Pertinent negatives include no abdominal pain, arthralgias, chest pain, chills, congestion, coughing, diaphoresis, fever, headaches, joint swelling, myalgias, nausea, neck pain, numbness, rash, sore throat or vomiting. Nothing aggravates the symptoms. She has tried nothing for the symptoms. The treatment provided no relief.   Mental Health Problem  The primary symptoms include hallucinations. The primary symptoms do not include dysphoric mood. The current episode started more than 1 month ago.   The hallucinations began more than 1 month ago. The hallucinations appear to have been unchanged since their onset. She has visual hallucinations.   The onset of the illness is precipitated by a stressful event and emotional stress. The degree of incapacity that she is experiencing as a consequence of her illness is mild. Additional symptoms of the illness include fatigue.

## 2024-03-05 RX ORDER — LORAZEPAM 0.5 MG/1
TABLET ORAL
Qty: 60 TABLET | Refills: 0 | Status: SHIPPED | OUTPATIENT
Start: 2024-03-05 | End: 2024-05-04

## 2024-04-18 NOTE — PLAN OF CARE
Denies SI, HI, and hallucinations. Patient is alert and oriented to self, year, and place. She is forgetful at times. Affect is sad and patient states \"I feel like I can't do anything here. \" Emotional support offered. She continues to report feeling dizzy at times. Patient was assisted to the bathroom and she had a small bowel movement. Lucio hose on and bed alarm on. Medication compliant. No behavioral issues or PRNs administered. No complaints or concerns verbalized. Will continue to monitor and offer support.         Problem: Falls - Risk of:  Goal: Will remain free from falls  Description: Will remain free from falls  Outcome: Met This Shift     Problem: Altered Mood, Depressive Behavior:  Goal: Ability to disclose and discuss suicidal ideas will improve  Description: Ability to disclose and discuss suicidal ideas will improve  6/15/2021 2145 by Yogesh Cedeno RN  Outcome: Met This Shift     Problem: Altered Mood, Depressive Behavior:  Goal: Able to verbalize acceptance of life and situations over which he or she has no control  Description: Able to verbalize acceptance of life and situations over which he or she has no control  Outcome: Not Met This Shift     Problem: Altered Mood, Deterioration in Function:  Goal: Ability to perform activities of daily living will improve  Description: Ability to perform activities of daily living will improve  Outcome: Not Met This Shift           Electronically signed by Yogesh Cedeno RN on 6/15/2021 at 9:48 PM
Problem: Altered Mood, Depressive Behavior:  Goal: Able to verbalize and/or display a decrease in depressive symptoms  Description: Able to verbalize and/or display a decrease in depressive symptoms  Outcome: Ongoing     Problem: Altered Mood, Depressive Behavior:  Goal: Ability to disclose and discuss suicidal ideas will improve  Description: Ability to disclose and discuss suicidal ideas will improve  Outcome: Met This Shift     Problem: Altered Mood, Depressive Behavior:  Goal: Able to verbalize support systems  Description: Able to verbalize support systems  Outcome: Met This Shift     Pt denies SI HI and AVH. Pt rates anxiety 9/10 and depression 8/10. Pt is anxious and wanting to go back to [employer] today. When out on the unit she is social with peers. Pt is taking prescribed medication, attending groups and eating provided meals. Will continue to monitor.
Problem: Falls - Risk of:  Goal: Absence of physical injury  Description: Absence of physical injury  Outcome: Met This Shift     Problem: Altered Mood, Depressive Behavior:  Goal: Ability to disclose and discuss suicidal ideas will improve  Description: Ability to disclose and discuss suicidal ideas will improve  Outcome: Met This Shift     Problem: Altered Mood, Depressive Behavior:  Goal: Able to verbalize and/or display a decrease in depressive symptoms  Description: Able to verbalize and/or display a decrease in depressive symptoms  6/15/2021 1104 by Freedom Tom RN  Outcome: Ongoing  6/15/2021 0123 by Gregory Saldaña RN  Outcome: Ongoing     Problem: Altered Mood, Deterioration in Function:  Goal: Maintenance of adequate nutrition will improve  Description: Maintenance of adequate nutrition will improve  Outcome: Ongoing
Problem: Falls - Risk of:  Goal: Absence of physical injury  Description: Absence of physical injury  Outcome: Met This Shift     Problem: Altered Mood, Depressive Behavior:  Goal: Able to verbalize acceptance of life and situations over which he or she has no control  Description: Able to verbalize acceptance of life and situations over which he or she has no control  6/16/2021 1121 by Lele Kim RN  Outcome: Met This Shift  6/15/2021 2145 by Freddy Chaidez RN  Outcome: Not Met This Shift     Problem: Altered Mood, Depressive Behavior:  Goal: Able to verbalize and/or display a decrease in depressive symptoms  Description: Able to verbalize and/or display a decrease in depressive symptoms  Outcome: Met This Shift     Problem: Altered Mood, Depressive Behavior:  Goal: Ability to disclose and discuss suicidal ideas will improve  Description: Ability to disclose and discuss suicidal ideas will improve  6/16/2021 1121 by Lele Kim RN  Outcome: Met This Shift  6/15/2021 2145 by Freddy Chaidez RN  Outcome: Met This Shift     Problem: Altered Mood, Depressive Behavior:  Goal: Able to verbalize support systems  Description: Able to verbalize support systems  Outcome: Met This Shift     Problem: Altered Mood, Deterioration in Function:  Goal: Maintenance of adequate nutrition will improve  Description: Maintenance of adequate nutrition will improve  Outcome: Met This Shift
Problem: Falls - Risk of:  Goal: Will remain free from falls  Description: Will remain free from falls  Outcome: Ongoing     Problem: Altered Mood, Depressive Behavior:  Goal: Able to verbalize acceptance of life and situations over which he or she has no control  Description: Able to verbalize acceptance of life and situations over which he or she has no control  Outcome: Ongoing  Goal: Able to verbalize and/or display a decrease in depressive symptoms  Description: Able to verbalize and/or display a decrease in depressive symptoms  Outcome: Ongoing     Problem: Altered Mood, Deterioration in Function:  Goal: Ability to perform activities of daily living will improve  Description: Ability to perform activities of daily living will improve  Outcome: Ongoing  Goal: Ability to tolerate increased activity will improve  Description: Ability to tolerate increased activity will improve  Outcome: Ongoing
none

## 2024-05-29 ENCOUNTER — TELEPHONE (OUTPATIENT)
Dept: PRIMARY CARE CLINIC | Age: 89
End: 2024-05-29

## 2024-05-29 NOTE — TELEPHONE ENCOUNTER
Face red,dry, itchy. Scratching constantly, Tried to get into Dr Miller but she can't get in until July 24th.  Nurse thinks it's her nerves. She did make an appt. With you for Tuesday but agreed to send her into [facility] if worsening of s/s.

## 2024-06-04 ENCOUNTER — OFFICE VISIT (OUTPATIENT)
Dept: PRIMARY CARE CLINIC | Age: 89
End: 2024-06-04
Payer: MEDICARE

## 2024-06-04 VITALS
TEMPERATURE: 97.6 F | OXYGEN SATURATION: 92 % | SYSTOLIC BLOOD PRESSURE: 96 MMHG | WEIGHT: 141 LBS | HEART RATE: 92 BPM | RESPIRATION RATE: 16 BRPM | DIASTOLIC BLOOD PRESSURE: 48 MMHG | BODY MASS INDEX: 27.68 KG/M2 | HEIGHT: 60 IN

## 2024-06-04 DIAGNOSIS — N18.31 STAGE 3A CHRONIC KIDNEY DISEASE (HCC): ICD-10-CM

## 2024-06-04 DIAGNOSIS — L30.9 ECZEMA, UNSPECIFIED TYPE: ICD-10-CM

## 2024-06-04 DIAGNOSIS — G30.9 ALZHEIMER'S DISEASE, UNSPECIFIED (CODE) (HCC): ICD-10-CM

## 2024-06-04 DIAGNOSIS — F03.C18 SEVERE DEMENTIA WITH OTHER BEHAVIORAL DISTURBANCE, UNSPECIFIED DEMENTIA TYPE (HCC): ICD-10-CM

## 2024-06-04 DIAGNOSIS — I50.9 CHRONIC CONGESTIVE HEART FAILURE, UNSPECIFIED HEART FAILURE TYPE (HCC): ICD-10-CM

## 2024-06-04 DIAGNOSIS — L85.3 DRY SKIN: Primary | ICD-10-CM

## 2024-06-04 DIAGNOSIS — I48.0 PAROXYSMAL ATRIAL FIBRILLATION (HCC): ICD-10-CM

## 2024-06-04 DIAGNOSIS — I10 ESSENTIAL HYPERTENSION: ICD-10-CM

## 2024-06-04 DIAGNOSIS — I95.1 ORTHOSTATIC HYPOTENSION: Chronic | ICD-10-CM

## 2024-06-04 PROCEDURE — 1090F PRES/ABSN URINE INCON ASSESS: CPT | Performed by: FAMILY MEDICINE

## 2024-06-04 PROCEDURE — 1123F ACP DISCUSS/DSCN MKR DOCD: CPT | Performed by: FAMILY MEDICINE

## 2024-06-04 PROCEDURE — 99213 OFFICE O/P EST LOW 20 MIN: CPT | Performed by: FAMILY MEDICINE

## 2024-06-04 PROCEDURE — 1036F TOBACCO NON-USER: CPT | Performed by: FAMILY MEDICINE

## 2024-06-04 PROCEDURE — G8427 DOCREV CUR MEDS BY ELIG CLIN: HCPCS | Performed by: FAMILY MEDICINE

## 2024-06-04 PROCEDURE — G8419 CALC BMI OUT NRM PARAM NOF/U: HCPCS | Performed by: FAMILY MEDICINE

## 2024-06-04 SDOH — ECONOMIC STABILITY: FOOD INSECURITY: WITHIN THE PAST 12 MONTHS, THE FOOD YOU BOUGHT JUST DIDN'T LAST AND YOU DIDN'T HAVE MONEY TO GET MORE.: NEVER TRUE

## 2024-06-04 SDOH — ECONOMIC STABILITY: FOOD INSECURITY: WITHIN THE PAST 12 MONTHS, YOU WORRIED THAT YOUR FOOD WOULD RUN OUT BEFORE YOU GOT MONEY TO BUY MORE.: NEVER TRUE

## 2024-06-04 SDOH — ECONOMIC STABILITY: HOUSING INSECURITY
IN THE LAST 12 MONTHS, WAS THERE A TIME WHEN YOU DID NOT HAVE A STEADY PLACE TO SLEEP OR SLEPT IN A SHELTER (INCLUDING NOW)?: NO

## 2024-06-04 SDOH — ECONOMIC STABILITY: INCOME INSECURITY: HOW HARD IS IT FOR YOU TO PAY FOR THE VERY BASICS LIKE FOOD, HOUSING, MEDICAL CARE, AND HEATING?: NOT HARD AT ALL

## 2024-06-04 ASSESSMENT — ENCOUNTER SYMPTOMS
NAUSEA: 0
SHORTNESS OF BREATH: 0
DIARRHEA: 0
SINUS PRESSURE: 0
APNEA: 0
SORE THROAT: 0
BACK PAIN: 0
EYE PAIN: 0
COLOR CHANGE: 0
RHINORRHEA: 0
VOMITING: 0
BLOOD IN STOOL: 0
EYE REDNESS: 0
ABDOMINAL PAIN: 0
CONSTIPATION: 0
CHEST TIGHTNESS: 0
WHEEZING: 0
COUGH: 0
EYE ITCHING: 0

## 2024-06-04 NOTE — PROGRESS NOTES
mirtazapine (REMERON) 30 MG tablet TAKE 1 TABLET BY MOUTH NIGHTLY 30 tablet 5    loratadine (CLARITIN) 10 MG tablet TAKE 1 TABLET BY MOUTH EVERY MORNING 30 tablet 5    VENTOLIN  (90 Base) MCG/ACT inhaler INHALE 2 PUFFS INTO THE LUNGS FOUR TIMES DAILY 18 g 1    Multiple Vitamins-Minerals (OCUVITE-LUTEIN) TABS oral tablet Take 1 tablet by mouth daily 30 tablet 5    acetaminophen (TYLENOL) 325 MG tablet Take 2 tablets by mouth every 6 hours as needed for Pain 120 tablet 3    aspirin EC 81 MG EC tablet Take 1 tablet by mouth daily 90 tablet 1    Elastic Bandages & Supports (MEDICAL COMPRESSION STOCKINGS) MISC 1 each by Does not apply route daily 20-30 mmHg. Knee high. Dx Venous insufficiency 1 each 0    Handicap Placard MISC by Does not apply route Patient cannot walk 200 ft without stopping to rest.    Expiration 5 yrs 1 each 0    ibuprofen (ADVIL;MOTRIN) 200 MG tablet Take 1 tablet by mouth every 6 hours as needed for Pain      Sodium Phosphates (ENEMA DISPOSABLE) ENEM Place 1 enema rectally as needed      hypromellose 0.4 % SOLN ophthalmic solution Place 1 drop into both eyes 4 times daily      Handicap Placard MISC by Does not apply route Patient cannot walk 200 ft without stopping to rest.    Expiration 5 yrs 1 each 0    magnesium hydroxide (MILK OF MAGNESIA) 400 MG/5ML suspension Take 30 mLs by mouth daily as needed for Constipation      gabapentin (NEURONTIN) 100 MG capsule TAKE ONE CAPSULE BY MOUTH EVERY MORNING 90 capsule 2     No current facility-administered medications for this visit.       No Known Allergies    Social History     Socioeconomic History    Marital status:      Spouse name: None    Number of children: None    Years of education: None    Highest education level: None   Occupational History    Occupation: retired     Employer: RETIRED   Tobacco Use    Smoking status: Never    Smokeless tobacco: Never   Substance and Sexual Activity    Alcohol use: No    Drug use: No    Sexual

## 2024-08-06 ENCOUNTER — OFFICE VISIT (OUTPATIENT)
Dept: PRIMARY CARE CLINIC | Age: 89
End: 2024-08-06
Payer: MEDICARE

## 2024-08-06 VITALS
WEIGHT: 141 LBS | TEMPERATURE: 98.4 F | OXYGEN SATURATION: 92 % | SYSTOLIC BLOOD PRESSURE: 126 MMHG | HEART RATE: 96 BPM | BODY MASS INDEX: 27.68 KG/M2 | DIASTOLIC BLOOD PRESSURE: 70 MMHG | HEIGHT: 60 IN

## 2024-08-06 DIAGNOSIS — R53.83 FATIGUE, UNSPECIFIED TYPE: ICD-10-CM

## 2024-08-06 DIAGNOSIS — I48.0 PAROXYSMAL ATRIAL FIBRILLATION (HCC): Primary | ICD-10-CM

## 2024-08-06 DIAGNOSIS — I50.9 CHRONIC CONGESTIVE HEART FAILURE, UNSPECIFIED HEART FAILURE TYPE (HCC): ICD-10-CM

## 2024-08-06 DIAGNOSIS — F41.9 ANXIETY: ICD-10-CM

## 2024-08-06 DIAGNOSIS — N18.31 STAGE 3A CHRONIC KIDNEY DISEASE (HCC): ICD-10-CM

## 2024-08-06 DIAGNOSIS — N39.0 URINARY TRACT INFECTION WITHOUT HEMATURIA, SITE UNSPECIFIED: ICD-10-CM

## 2024-08-06 DIAGNOSIS — K21.9 GASTROESOPHAGEAL REFLUX DISEASE WITHOUT ESOPHAGITIS: ICD-10-CM

## 2024-08-06 DIAGNOSIS — G25.0 BENIGN ESSENTIAL TREMOR: ICD-10-CM

## 2024-08-06 DIAGNOSIS — J44.9 CHRONIC OBSTRUCTIVE PULMONARY DISEASE, UNSPECIFIED COPD TYPE (HCC): ICD-10-CM

## 2024-08-06 DIAGNOSIS — R05.2 SUBACUTE COUGH: ICD-10-CM

## 2024-08-06 DIAGNOSIS — I10 ESSENTIAL HYPERTENSION: ICD-10-CM

## 2024-08-06 LAB
ALBUMIN: 3.9 G/DL (ref 3.5–5.2)
ALP BLD-CCNC: 145 U/L (ref 35–104)
ALT SERPL-CCNC: 8 U/L (ref 0–32)
ANION GAP SERPL CALCULATED.3IONS-SCNC: 12 MMOL/L (ref 7–16)
AST SERPL-CCNC: 21 U/L (ref 0–31)
BACTERIA: ABNORMAL
BILIRUB SERPL-MCNC: 0.3 MG/DL (ref 0–1.2)
BILIRUBIN, URINE: NEGATIVE
BUN BLDV-MCNC: 24 MG/DL (ref 6–23)
CALCIUM SERPL-MCNC: 9.6 MG/DL (ref 8.6–10.2)
CHLORIDE BLD-SCNC: 105 MMOL/L (ref 98–107)
CO2: 24 MMOL/L (ref 22–29)
COLOR, UA: YELLOW
CREAT SERPL-MCNC: 1.2 MG/DL (ref 0.5–1)
EPITHELIAL CELLS, UA: ABNORMAL /HPF
GFR, ESTIMATED: 43 ML/MIN/1.73M2
GLUCOSE BLD-MCNC: 97 MG/DL (ref 74–99)
GLUCOSE URINE: NEGATIVE MG/DL
HCT VFR BLD CALC: 39.4 % (ref 34–48)
HEMOGLOBIN: 12.5 G/DL (ref 11.5–15.5)
KETONES, URINE: NEGATIVE MG/DL
LEUKOCYTE ESTERASE, URINE: ABNORMAL
MCH RBC QN AUTO: 33.5 PG (ref 26–35)
MCHC RBC AUTO-ENTMCNC: 31.7 G/DL (ref 32–34.5)
MCV RBC AUTO: 105.6 FL (ref 80–99.9)
NITRITE, URINE: NEGATIVE
PDW BLD-RTO: 14.4 % (ref 11.5–15)
PH, URINE: 6 (ref 5–9)
PLATELET # BLD: 158 K/UL (ref 130–450)
PMV BLD AUTO: 10.6 FL (ref 7–12)
POTASSIUM SERPL-SCNC: 5 MMOL/L (ref 3.5–5)
PROTEIN UA: ABNORMAL MG/DL
RBC # BLD: 3.73 M/UL (ref 3.5–5.5)
RBC UA: ABNORMAL /HPF
SODIUM BLD-SCNC: 141 MMOL/L (ref 132–146)
SPECIFIC GRAVITY UA: >1.03 (ref 1–1.03)
TOTAL PROTEIN: 7.4 G/DL (ref 6.4–8.3)
TSH SERPL DL<=0.05 MIU/L-ACNC: 5.41 UIU/ML (ref 0.27–4.2)
TURBIDITY: ABNORMAL
URINE HGB: NEGATIVE
UROBILINOGEN, URINE: 0.2 EU/DL (ref 0–1)
WBC # BLD: 8.9 K/UL (ref 4.5–11.5)
WBC UA: ABNORMAL /HPF

## 2024-08-06 PROCEDURE — 99214 OFFICE O/P EST MOD 30 MIN: CPT | Performed by: FAMILY MEDICINE

## 2024-08-06 PROCEDURE — G8419 CALC BMI OUT NRM PARAM NOF/U: HCPCS | Performed by: FAMILY MEDICINE

## 2024-08-06 PROCEDURE — G8427 DOCREV CUR MEDS BY ELIG CLIN: HCPCS | Performed by: FAMILY MEDICINE

## 2024-08-06 PROCEDURE — 1123F ACP DISCUSS/DSCN MKR DOCD: CPT | Performed by: FAMILY MEDICINE

## 2024-08-06 PROCEDURE — 1036F TOBACCO NON-USER: CPT | Performed by: FAMILY MEDICINE

## 2024-08-06 PROCEDURE — 3023F SPIROM DOC REV: CPT | Performed by: FAMILY MEDICINE

## 2024-08-06 PROCEDURE — 1090F PRES/ABSN URINE INCON ASSESS: CPT | Performed by: FAMILY MEDICINE

## 2024-08-06 RX ORDER — LORAZEPAM 1 MG/1
1 TABLET ORAL NIGHTLY
COMMUNITY
Start: 2024-07-15

## 2024-08-06 ASSESSMENT — ENCOUNTER SYMPTOMS
COUGH: 0
ABDOMINAL PAIN: 0
CHEST TIGHTNESS: 0
VOMITING: 0
BLOOD IN STOOL: 0
SORE THROAT: 0
SHORTNESS OF BREATH: 0
EYE PAIN: 0
COLOR CHANGE: 0
APNEA: 0
CONSTIPATION: 0
NAUSEA: 0
SINUS PRESSURE: 0
EYE ITCHING: 0
WHEEZING: 0
RHINORRHEA: 0
EYE REDNESS: 0
DIARRHEA: 0
BACK PAIN: 0

## 2024-08-06 ASSESSMENT — COPD QUESTIONNAIRES: COPD: 1

## 2024-08-06 NOTE — PROGRESS NOTES
hypertension  -     CBC; Future  -     Comprehensive Metabolic Panel; Future  -     TSH; Future  Anxiety  Fatigue, unspecified type  -     CBC; Future  -     Comprehensive Metabolic Panel; Future  -     TSH; Future  Benign essential tremor  Gastroesophageal reflux disease without esophagitis  Chronic obstructive pulmonary disease, unspecified COPD type (HCC)  -     XR CHEST (2 VW); Future  Subacute cough  -     XR CHEST (2 VW); Future      Orders Placed This Encounter    XR CHEST (2 VW)     Standing Status:   Future     Standing Expiration Date:   8/6/2025    CBC     Standing Status:   Future     Standing Expiration Date:   8/6/2025    Comprehensive Metabolic Panel     Standing Status:   Future     Standing Expiration Date:   8/6/2025    TSH     Standing Status:   Future     Standing Expiration Date:   8/6/2025    LORazepam (ATIVAN) 1 MG tablet     Sig: Take 1 tablet by mouth nightly. Max Daily Amount: 1 mg      Counseled regarding above diagnosis, including possible risks and complications,  especially if left uncontrolled.     Counseled regarding the possible side effects, risks, benefits and alternatives to treatment; patient and/or guardian verbalizes understanding, agrees, feels comfortable with and wishes to proceed with above treatment plan.     Advised patient to call with any new medication issues, and read all Rx info from pharmacy to assure aware of all possible risks and side effects of medication before taking.     Reviewed age and gender appropriate health screening exams and vaccinations.  Advised patient regarding importance of keeping up with recommended health maintenance and to schedule as soon as possible if overdue, as this is important in assessing for undiagnosed pathology, especially cancer, as well as protecting against potentially harmful/life threatening disease.      Return in about 3 months (around 11/6/2024) for Medicare AWV.      I spent 30 minutes with this patient.  I spent greater

## 2024-08-07 RX ORDER — SULFAMETHOXAZOLE AND TRIMETHOPRIM 400; 80 MG/1; MG/1
1 TABLET ORAL DAILY
Qty: 10 TABLET | Refills: 0 | Status: SHIPPED | OUTPATIENT
Start: 2024-08-07 | End: 2024-08-17

## 2024-08-15 ENCOUNTER — TELEPHONE (OUTPATIENT)
Dept: PRIMARY CARE CLINIC | Age: 89
End: 2024-08-15

## 2024-08-15 DIAGNOSIS — N39.0 URINARY TRACT INFECTION WITHOUT HEMATURIA, SITE UNSPECIFIED: Primary | ICD-10-CM

## 2024-08-15 NOTE — TELEPHONE ENCOUNTER
/78 c/o numbness in legs with generalized weakness, doesn't usually complain. No leg edema or c/o chest pain. Please advise.

## 2024-08-16 LAB
BILIRUBIN, URINE: NORMAL
BLOOD, URINE: NORMAL
CLARITY, UA: NORMAL
COLOR, UA: NORMAL
GLUCOSE URINE: NORMAL
KETONES, URINE: NORMAL
LEUKOCYTE ESTERASE, URINE: NORMAL
NITRITE, URINE: NORMAL
PH UA: NORMAL
PROTEIN UA: NORMAL
SPECIFIC GRAVITY UA: NORMAL
UROBILINOGEN, URINE: NORMAL

## 2024-09-09 ENCOUNTER — TELEPHONE (OUTPATIENT)
Dept: PRIMARY CARE CLINIC | Age: 89
End: 2024-09-09

## 2024-09-30 ENCOUNTER — TELEPHONE (OUTPATIENT)
Dept: PRIMARY CARE CLINIC | Age: 89
End: 2024-09-30

## 2024-09-30 RX ORDER — NAPROXEN 375 MG/1
375 TABLET, DELAYED RELEASE ORAL DAILY PRN
Qty: 30 TABLET | Refills: 0 | Status: SHIPPED | OUTPATIENT
Start: 2024-09-30

## 2024-09-30 NOTE — TELEPHONE ENCOUNTER
Randee notified, said they have been giving her tylenol and its not helping, she is having a rough day. Please call medication [facility].

## 2024-09-30 NOTE — TELEPHONE ENCOUNTER
Randee with [facility] calling regarding patient. Patient is complaining of pain behind R ear, on occipital bone. When asked what the pain feels like, patient said \" like a sharp dagger\". No pain internal, all the pain is externally behind ear. They are not sure what to do for this patient and asking for advice.

## 2024-10-23 ENCOUNTER — HOSPITAL ENCOUNTER (OUTPATIENT)
Dept: AUDIOLOGY | Age: 89
Discharge: HOME | End: 2024-10-23
Payer: MEDICARE

## 2024-10-23 ENCOUNTER — TELEPHONE (OUTPATIENT)
Dept: PRIMARY CARE CLINIC | Age: 89
End: 2024-10-23

## 2024-10-23 DIAGNOSIS — H91.93 BILATERAL HEARING LOSS, UNSPECIFIED HEARING LOSS TYPE: Primary | ICD-10-CM

## 2024-10-23 PROCEDURE — 92557 COMPREHENSIVE HEARING TEST: CPT | Performed by: AUDIOLOGIST

## 2024-10-23 PROCEDURE — 92567 TYMPANOMETRY: CPT | Performed by: AUDIOLOGIST

## 2024-10-23 NOTE — PROGRESS NOTES
This patient was referred for audiometric and tympanometric testing by  Dr Norwood  due to hearing loss.   She resides at [facility] and was accompanied by a CNA - Malini.  Norah has worn hearing aids for many years.  She has [employer] aids. She states one of them works OK, and the other one doesn't work at all.  She is eligible for new hearing aids through her [facility].      Audiometry using pure tone air and bone conduction testing revealed a moderately severe-to-severe  sensorineural hearing loss, bilaterally. Reliability was good. Speech reception thresholds were in good agreement with the pure tone averages, bilaterally. Speech discrimination scores were fair, in the right ear and poor in the left ear.      Tympanometry revealed flat tympanograms, bilaterally.    The results were reviewed with the patient and sent to the ordering provider.  Pure tone testing is comparable to testing completed 5 years ago.  However a change was noted on her speech discrimination scores.      Recommend re test if a change in hearing is noted.  Once a CMN is signed and received by PCP, a prior authorization will be completed through [facility] for new binaural RITE hearing aids with ear molds and .   (2- 85 dB speakers, silver grey hearing aids)     Electronically signed by Kerline Richards on 10/23/2024 at 2:53 PM

## 2024-10-23 NOTE — TELEPHONE ENCOUNTER
The nurse from [facility] where the pt resides is calling because the pt is at  [facility] on [facility] to get a hearing test to get new hearing aids, she called the wrong doctor to get an order for the hearing test so the order was never placed and they won't see her if she doesn't have an order, I told her that Dr Hagen isn't here today and she is asking if another doctor would be able to place the order because it is very hard to get in to see them, if you are okay with the order it will need to be faxed over to them at [phone]

## 2024-10-24 DIAGNOSIS — F41.9 ANXIETY: Primary | ICD-10-CM

## 2024-10-24 RX ORDER — LORAZEPAM 1 MG/1
1 TABLET ORAL NIGHTLY
Qty: 30 TABLET | Refills: 0 | Status: SHIPPED | OUTPATIENT
Start: 2024-10-24 | End: 2024-11-23

## 2024-11-07 ENCOUNTER — TELEPHONE (OUTPATIENT)
Dept: PRIMARY CARE CLINIC | Age: 89
End: 2024-11-07

## 2024-11-07 NOTE — TELEPHONE ENCOUNTER
Pt very anxious, it has increased. Takes Ativan 1mg at hs and 0.5mg q12prn, they are asking if it can be increased to 1mg or have it more frequently?

## 2024-11-11 ENCOUNTER — OFFICE VISIT (OUTPATIENT)
Dept: PRIMARY CARE CLINIC | Age: 89
End: 2024-11-11

## 2024-11-11 VITALS
SYSTOLIC BLOOD PRESSURE: 114 MMHG | RESPIRATION RATE: 16 BRPM | BODY MASS INDEX: 26.7 KG/M2 | HEART RATE: 92 BPM | OXYGEN SATURATION: 92 % | DIASTOLIC BLOOD PRESSURE: 68 MMHG | HEIGHT: 60 IN | WEIGHT: 136 LBS

## 2024-11-11 DIAGNOSIS — Z00.00 MEDICARE ANNUAL WELLNESS VISIT, SUBSEQUENT: Primary | ICD-10-CM

## 2024-11-11 RX ORDER — HYDROCORTISONE 25 MG/G
CREAM TOPICAL 2 TIMES DAILY
COMMUNITY

## 2024-11-11 ASSESSMENT — PATIENT HEALTH QUESTIONNAIRE - PHQ9
SUM OF ALL RESPONSES TO PHQ QUESTIONS 1-9: 2
7. TROUBLE CONCENTRATING ON THINGS, SUCH AS READING THE NEWSPAPER OR WATCHING TELEVISION: NOT AT ALL
SUM OF ALL RESPONSES TO PHQ QUESTIONS 1-9: 2
4. FEELING TIRED OR HAVING LITTLE ENERGY: NOT AT ALL
6. FEELING BAD ABOUT YOURSELF - OR THAT YOU ARE A FAILURE OR HAVE LET YOURSELF OR YOUR FAMILY DOWN: NOT AT ALL
2. FEELING DOWN, DEPRESSED OR HOPELESS: SEVERAL DAYS
10. IF YOU CHECKED OFF ANY PROBLEMS, HOW DIFFICULT HAVE THESE PROBLEMS MADE IT FOR YOU TO DO YOUR WORK, TAKE CARE OF THINGS AT HOME, OR GET ALONG WITH OTHER PEOPLE: NOT DIFFICULT AT ALL
SUM OF ALL RESPONSES TO PHQ QUESTIONS 1-9: 2
8. MOVING OR SPEAKING SO SLOWLY THAT OTHER PEOPLE COULD HAVE NOTICED. OR THE OPPOSITE, BEING SO FIGETY OR RESTLESS THAT YOU HAVE BEEN MOVING AROUND A LOT MORE THAN USUAL: NOT AT ALL
SUM OF ALL RESPONSES TO PHQ QUESTIONS 1-9: 2
SUM OF ALL RESPONSES TO PHQ9 QUESTIONS 1 & 2: 2
9. THOUGHTS THAT YOU WOULD BE BETTER OFF DEAD, OR OF HURTING YOURSELF: NOT AT ALL
5. POOR APPETITE OR OVEREATING: NOT AT ALL
1. LITTLE INTEREST OR PLEASURE IN DOING THINGS: SEVERAL DAYS
3. TROUBLE FALLING OR STAYING ASLEEP: NOT AT ALL

## 2024-11-11 ASSESSMENT — LIFESTYLE VARIABLES
HOW OFTEN DO YOU HAVE A DRINK CONTAINING ALCOHOL: NEVER
HOW MANY STANDARD DRINKS CONTAINING ALCOHOL DO YOU HAVE ON A TYPICAL DAY: PATIENT DOES NOT DRINK

## 2024-11-11 NOTE — PROGRESS NOTES
Reported on 11/11/2024  Wayne Hagen DO   potassium chloride (KLOR-CON M) 20 MEQ extended release tablet TAKE 1 TABLET BY MOUTH TWICE DAILY WITH MEALS  Patient not taking: Reported on 11/11/2024  Wayne Hagen DO   gabapentin (NEURONTIN) 100 MG capsule TAKE ONE CAPSULE BY MOUTH EVERY MORNING  Wayne Hagen DO   VENTOLIN  (90 Base) MCG/ACT inhaler INHALE 2 PUFFS INTO THE LUNGS FOUR TIMES DAILY  Patient not taking: Reported on 11/11/2024  Wayne Hagen DO   Sodium Phosphates (ENEMA DISPOSABLE) ENEM Place 1 enema rectally as needed  Patient not taking: Reported on 11/11/2024  ProviderPadilla MD   hypromellose 0.4 % SOLN ophthalmic solution Place 1 drop into both eyes 4 times daily  Patient not taking: Reported on 11/11/2024  ProviderPadilla MD       CareTeam (Including outside providers/suppliers regularly involved in providing care):   Patient Care Team:  Wayne Hagen DO as PCP - General (Family Medicine)  Wayne Hagen DO as PCP - Empaneled Provider  Matt Cohen MD as Consulting Physician (Cardiac Electrophysiology)      Reviewed and updated this visit:  Tobacco  Allergies  Meds  Problems  Med Hx  Surg Hx  Soc Hx  Fam Hx

## 2024-11-11 NOTE — PATIENT INSTRUCTIONS
Learning About Being Active as an Older Adult  Why is being active important as you get older?     Being active is one of the best things you can do for your health. And it's never too late to start. Being active--or getting active, if you aren't already--has definite benefits. It can:  Give you more energy,  Keep your mind sharp.  Improve balance to reduce your risk of falls.  Help you manage chronic illness with fewer medicines.  No matter how old you are, how fit you are, or what health problems you have, there is a form of activity that will work for you. And the more physical activity you can do, the better your overall health will be.  What kinds of activity can help you stay healthy?  Being more active will make your daily activities easier. Physical activity includes planned exercise and things you do in daily life. There are four types of activity:  Aerobic.  Doing aerobic activity makes your heart and lungs strong.  Includes walking, dancing, and gardening.  Aim for at least 2½ hours spread throughout the week.  It improves your energy and can help you sleep better.  Muscle-strengthening.  This type of activity can help maintain muscle and strengthen bones.  Includes climbing stairs, using resistance bands, and lifting or carrying heavy loads.  Aim for at least twice a week.  It can help protect the knees and other joints.  Stretching.  Stretching gives you better range of motion in joints and muscles.  Includes upper arm stretches, calf stretches, and gentle yoga.  Aim for at least twice a week, preferably after your muscles are warmed up from other activities.  It can help you function better in daily life.  Balancing.  This helps you stay coordinated and have good posture.  Includes heel-to-toe walking, nancy chi, and certain types of yoga.  Aim for at least 3 days a week.  It can reduce your risk of falling.  Even if you have a hard time meeting the recommendations, it's better to be more active

## 2024-11-12 DIAGNOSIS — F41.9 ANXIETY: ICD-10-CM

## 2024-11-12 RX ORDER — LORAZEPAM 1 MG/1
1 TABLET ORAL 2 TIMES DAILY
Qty: 60 TABLET | Refills: 0 | Status: SHIPPED | OUTPATIENT
Start: 2024-11-12 | End: 2024-12-12

## 2024-11-20 ENCOUNTER — TELEPHONE (OUTPATIENT)
Dept: PRIMARY CARE CLINIC | Age: 89
End: 2024-11-20

## 2024-11-20 NOTE — TELEPHONE ENCOUNTER
Nurse Cheryle calling patient was placed on o2 11/15 for anxiety induced sob. Asking if o2 could be discontinued stats have been above 92% on room air. She has also been refusing the o2.

## 2024-11-26 ENCOUNTER — HOSPITAL ENCOUNTER (OUTPATIENT)
Dept: AUDIOLOGY | Age: 88
Discharge: HOME | End: 2024-11-26

## 2024-11-26 PROCEDURE — 9990000010 HC NO CHARGE VISIT: Performed by: AUDIOLOGIST

## 2024-11-26 NOTE — PROGRESS NOTES
No charge hearing aid fitting for 30 day trial per insurance guidelines.  [facility] -nursing aid present for fitting.   Reviewed use and care.  Documents scanned to [facility].  Will call Jennifer at [facility] to verify appt.   Placed on sched for billing vs in person appt  12/18/24 at 2 pm.  Electronically signed by Kerline Richards on 11/26/2024 at 12:15 PM

## 2024-12-02 ENCOUNTER — TELEPHONE (OUTPATIENT)
Dept: PRIMARY CARE CLINIC | Age: 88
End: 2024-12-02